# Patient Record
Sex: MALE | Race: WHITE | Employment: FULL TIME | URBAN - METROPOLITAN AREA
[De-identification: names, ages, dates, MRNs, and addresses within clinical notes are randomized per-mention and may not be internally consistent; named-entity substitution may affect disease eponyms.]

---

## 2017-03-03 ENCOUNTER — OFFICE VISIT (OUTPATIENT)
Dept: FAMILY MEDICINE CLINIC | Facility: CLINIC | Age: 52
End: 2017-03-03

## 2017-03-03 VITALS
BODY MASS INDEX: 23.49 KG/M2 | WEIGHT: 167.81 LBS | HEART RATE: 80 BPM | TEMPERATURE: 98 F | HEIGHT: 71 IN | DIASTOLIC BLOOD PRESSURE: 70 MMHG | SYSTOLIC BLOOD PRESSURE: 126 MMHG | RESPIRATION RATE: 16 BRPM

## 2017-03-03 DIAGNOSIS — M62.830 MUSCLE SPASM OF BACK: ICD-10-CM

## 2017-03-03 DIAGNOSIS — G89.29 CHRONIC RIGHT-SIDED LOW BACK PAIN WITHOUT SCIATICA: Primary | ICD-10-CM

## 2017-03-03 DIAGNOSIS — M54.50 CHRONIC RIGHT-SIDED LOW BACK PAIN WITHOUT SCIATICA: Primary | ICD-10-CM

## 2017-03-03 PROCEDURE — 99214 OFFICE O/P EST MOD 30 MIN: CPT | Performed by: FAMILY MEDICINE

## 2017-03-03 RX ORDER — CYCLOBENZAPRINE HCL 10 MG
10 TABLET ORAL NIGHTLY PRN
Qty: 30 TABLET | Refills: 0 | Status: SHIPPED | OUTPATIENT
Start: 2017-03-03 | End: 2017-04-02

## 2017-03-03 NOTE — PATIENT INSTRUCTIONS
Advice rest, heating pad, aleve or ibuprofen as needed. Take cyclobenzaprine as needed. Medication will make you drowsy, so no driving after taking medication. Relieving Back Pain  Back pain is a common problem.  You can strain back muscles by li

## 2017-03-04 NOTE — PROGRESS NOTES
[facility] [place]  PROGRESS NOTE  Chief Complaint:   Patient presents with:  Back Pain: Back pain, has 2 metal rods in back picked something up at work and the next day was really sore      HPI:   This is a 49year old male presents complaining INTEGUMENTARY:  Denies rashes, itching, skin lesion, or excessive skin dryness.   CARDIOVASCULAR:  Denies chest pain, chest pressure, chest discomfort, palpitations, edema, dyspnea on exertion or at rest.  RESPIRATORY:  Denies shortness of breath, wheezing, PSYCHIATRIC: alert and oriented x 3; affect appropriate        ASSESSMENT AND PLAN:   Antoni Steiner was seen today for back pain.     Diagnoses and all orders for this visit:    Chronic right-sided low back pain without sciatica    Muscle spasm of back    Other o · Don’t use a heating pad for more than 15 minutes at a time. Never sleep on a heating pad. Date Last Reviewed: 9/1/2015  © 2733-4852 [address], [address] [place] [address]. All rights reserved.  This information is not intend

## 2017-03-17 ENCOUNTER — TELEPHONE (OUTPATIENT)
Dept: FAMILY MEDICINE CLINIC | Facility: CLINIC | Age: 52
End: 2017-03-17

## 2017-03-17 NOTE — TELEPHONE ENCOUNTER
Future Appointments  Date Time Provider Amadeo Hanna   3/17/2017 3:30 PM Uzma Roca MD [facility] [place]     Set up appt due to patient's back not feeling any better.

## 2017-03-30 RX ORDER — CYCLOBENZAPRINE HCL 10 MG
TABLET ORAL
Qty: 30 TABLET | Refills: 0 | OUTPATIENT
Start: 2017-03-30

## 2017-05-11 ENCOUNTER — OFFICE VISIT (OUTPATIENT)
Dept: FAMILY MEDICINE CLINIC | Facility: CLINIC | Age: 52
End: 2017-05-11

## 2017-05-11 ENCOUNTER — TELEPHONE (OUTPATIENT)
Dept: FAMILY MEDICINE CLINIC | Facility: CLINIC | Age: 52
End: 2017-05-11

## 2017-05-11 VITALS
TEMPERATURE: 98 F | DIASTOLIC BLOOD PRESSURE: 90 MMHG | SYSTOLIC BLOOD PRESSURE: 114 MMHG | HEART RATE: 96 BPM | BODY MASS INDEX: 23 KG/M2 | WEIGHT: 162.81 LBS | RESPIRATION RATE: 20 BRPM

## 2017-05-11 DIAGNOSIS — K21.9 GASTROESOPHAGEAL REFLUX DISEASE, ESOPHAGITIS PRESENCE NOT SPECIFIED: ICD-10-CM

## 2017-05-11 DIAGNOSIS — G89.29 CHRONIC RIGHT-SIDED LOW BACK PAIN WITHOUT SCIATICA: ICD-10-CM

## 2017-05-11 DIAGNOSIS — R10.13 EPIGASTRIC PAIN: Primary | ICD-10-CM

## 2017-05-11 DIAGNOSIS — M54.50 CHRONIC RIGHT-SIDED LOW BACK PAIN WITHOUT SCIATICA: ICD-10-CM

## 2017-05-11 PROCEDURE — 83690 ASSAY OF LIPASE: CPT | Performed by: FAMILY MEDICINE

## 2017-05-11 PROCEDURE — 82150 ASSAY OF AMYLASE: CPT | Performed by: FAMILY MEDICINE

## 2017-05-11 PROCEDURE — 80053 COMPREHEN METABOLIC PANEL: CPT | Performed by: FAMILY MEDICINE

## 2017-05-11 PROCEDURE — 85025 COMPLETE CBC W/AUTO DIFF WBC: CPT | Performed by: FAMILY MEDICINE

## 2017-05-11 PROCEDURE — 36415 COLL VENOUS BLD VENIPUNCTURE: CPT | Performed by: FAMILY MEDICINE

## 2017-05-11 PROCEDURE — 99214 OFFICE O/P EST MOD 30 MIN: CPT | Performed by: FAMILY MEDICINE

## 2017-05-11 RX ORDER — TRAMADOL HYDROCHLORIDE 50 MG/1
50 TABLET ORAL EVERY 4 HOURS PRN
Qty: 30 TABLET | Refills: 0 | Status: SHIPPED | OUTPATIENT
Start: 2017-05-11 | End: 2017-06-21

## 2017-05-11 RX ORDER — OMEPRAZOLE 20 MG/1
20 CAPSULE, DELAYED RELEASE ORAL
Qty: 30 CAPSULE | Refills: 0 | Status: SHIPPED | OUTPATIENT
Start: 2017-05-11 | End: 2017-06-21

## 2017-05-11 NOTE — PROGRESS NOTES
[address]  PROGRESS NOTE  Chief Complaint:   Patient presents with:  Abdominal Pain: for about 2 weeks. Usually after he eats       HPI:   This is a 49year old male presents complaining of epigastric pain for past 2 weeks.   Patient's p TraMADol HCl (ULTRAM) 50 MG Oral Tab Take 1 tablet (50 mg total) by mouth every 4 (four) hours as needed. Disp: 30 tablet Rfl: 0   gabapentin 300 MG Oral Cap Take 300 mg by mouth. 2 tablets by mouth three times per day.  Disp:  Rfl:    [DISCONTINUED] TraMAD HEART:  Regular rate and rhythm, S1 and S2 are normal, no murmurs, rubs or gallops. EXTREMITIES: No edema, no cyanosis, no clubbing, FROM, 2+ dorsalis pedis pulses bilaterally.   ABDOMEN: Soft, nondistended, slight tenderness palpation over epigastric shayan · Eat fewer acidic foods, such as citrus and tomato-based foods. These can increase symptoms. · Limit drinking alcohol, caffeine, and fizzy beverages. All increase acid reflux.   · Try limiting chocolate, peppermint, and spearmint. These can worsen acid re Generalized hyperhidrosis     Chronic pain     Routine general medical examination at a health care facility     Pre-operative examination     Other psoriasis     Sprain of costal cartilage     Injury of shoulder, left     Acute reaction to stress     G

## 2017-05-11 NOTE — PATIENT INSTRUCTIONS
Start omeprazole. Avoid food that causes acid reflux. Avoid large meals, don't eat close to bedtime. Check labs today. Recheck in 2 weeks if no improvement.          Tips to Control Acid Reflux    To control acid reflux, you’ll need to make some basic

## 2017-05-12 ENCOUNTER — TELEPHONE (OUTPATIENT)
Dept: FAMILY MEDICINE CLINIC | Facility: CLINIC | Age: 52
End: 2017-05-12

## 2017-05-12 NOTE — TELEPHONE ENCOUNTER
----- Message from Salvador Osorio MD sent at 5/11/2017 10:48 PM CDT -----  Please inform patient that CBC, CMP, amylase and lipase are all within normal limits. Advised to return to clinic if his abdominal pain does not improve.

## 2017-06-21 ENCOUNTER — HOSPITAL ENCOUNTER (OUTPATIENT)
Dept: GENERAL RADIOLOGY | Age: 52
Discharge: HOME | End: 2017-06-21
Attending: FAMILY MEDICINE
Payer: COMMERCIAL

## 2017-06-21 ENCOUNTER — TELEPHONE (OUTPATIENT)
Dept: FAMILY MEDICINE CLINIC | Facility: CLINIC | Age: 52
End: 2017-06-21

## 2017-06-21 ENCOUNTER — OFFICE VISIT (OUTPATIENT)
Dept: FAMILY MEDICINE CLINIC | Facility: CLINIC | Age: 52
End: 2017-06-21

## 2017-06-21 VITALS
DIASTOLIC BLOOD PRESSURE: 64 MMHG | HEIGHT: 71.5 IN | SYSTOLIC BLOOD PRESSURE: 104 MMHG | RESPIRATION RATE: 20 BRPM | WEIGHT: 164.5 LBS | HEART RATE: 88 BPM | TEMPERATURE: 98 F | BODY MASS INDEX: 22.53 KG/M2

## 2017-06-21 DIAGNOSIS — G89.29 CHRONIC RIGHT-SIDED LOW BACK PAIN WITHOUT SCIATICA: ICD-10-CM

## 2017-06-21 DIAGNOSIS — Z12.11 COLON CANCER SCREENING: ICD-10-CM

## 2017-06-21 DIAGNOSIS — M25.561 ACUTE PAIN OF RIGHT KNEE: ICD-10-CM

## 2017-06-21 DIAGNOSIS — K64.9 HEMORRHOIDS, UNSPECIFIED HEMORRHOID TYPE: ICD-10-CM

## 2017-06-21 DIAGNOSIS — M54.50 CHRONIC RIGHT-SIDED LOW BACK PAIN WITHOUT SCIATICA: ICD-10-CM

## 2017-06-21 DIAGNOSIS — Z00.00 PHYSICAL EXAM: Primary | ICD-10-CM

## 2017-06-21 DIAGNOSIS — K92.1 BLOOD IN STOOL: ICD-10-CM

## 2017-06-21 PROCEDURE — 73560 X-RAY EXAM OF KNEE 1 OR 2: CPT | Performed by: FAMILY MEDICINE

## 2017-06-21 PROCEDURE — 99213 OFFICE O/P EST LOW 20 MIN: CPT | Performed by: FAMILY MEDICINE

## 2017-06-21 PROCEDURE — 82272 OCCULT BLD FECES 1-3 TESTS: CPT | Performed by: FAMILY MEDICINE

## 2017-06-21 PROCEDURE — 99396 PREV VISIT EST AGE 40-64: CPT | Performed by: FAMILY MEDICINE

## 2017-06-21 RX ORDER — TRAMADOL HYDROCHLORIDE 50 MG/1
50 TABLET ORAL EVERY 8 HOURS PRN
Qty: 60 TABLET | Refills: 0 | Status: SHIPPED | OUTPATIENT
Start: 2017-06-21 | End: 2017-08-15

## 2017-06-21 RX ORDER — CEPHALEXIN 500 MG/1
500 CAPSULE ORAL 3 TIMES DAILY
Qty: 30 CAPSULE | Refills: 0 | Status: SHIPPED | OUTPATIENT
Start: 2017-06-21 | End: 2017-09-28

## 2017-06-21 RX ORDER — MELOXICAM 7.5 MG/1
7.5 TABLET ORAL 2 TIMES DAILY PRN
Qty: 30 TABLET | Refills: 0 | Status: SHIPPED | OUTPATIENT
Start: 2017-06-21 | End: 2017-09-28

## 2017-06-21 RX ORDER — GABAPENTIN 300 MG/1
CAPSULE ORAL
Qty: 120 CAPSULE | Refills: 2 | Status: SHIPPED | OUTPATIENT
Start: 2017-06-21 | End: 2017-08-15

## 2017-06-21 NOTE — PATIENT INSTRUCTIONS
Recommend rest, ice and meloxicam as needed for pain. May also use knee brace as needed   Start oral antibiotics for stye on R eye. Daily warm compress. See ophthalmologist at [address] if no improvement.    Schedule appointment with Dr Jeevan Ballard for colon

## 2017-06-21 NOTE — PROGRESS NOTES
HPI:   Helen Echavarria is a 49year old male who presents for an Annual Health Visit. Patient also complaining of stye or right upper eyelid that has been present for past 1 week. He denies any trouble with vision.   Also complaining of right knee p INGUINAL HERNIA REPAIR Left     TONSILLECTOMY      OTHER      Comment L shoulder surgery, L hand tendon repair      Family History   Problem Relation Age of Onset   • leukemia [Other] [OTHER] Mother       SOCIAL HISTORY     Smoking Status: Current Every Da present, tender with palpation, no warmth noted. Normal visual acuity  HEENT: normocephalic; normal nose, pharynx. TM's clear, no bulging, no retraction, no fluid, landmarks normal.  NECK: supple;  Full ROM; no JVD, no TMG, no carotid bruits  RESPIRATORY: unspecified hemorrhoid type  Blood in stool  Colon cancer screening    PLAN:       Signed Prescriptions Disp Refills    gabapentin 300 MG Oral Cap 120 capsule 2      Si tablets by mouth two times a day.       TraMADol HCl (ULTRAM) 50 MG Oral Tab 60 tabl

## 2017-06-21 NOTE — TELEPHONE ENCOUNTER
----- Message from Nadja Max MD sent at 6/21/2017  4:45 PM CDT -----  Please inform patient that radiology report shows mild inside of the joint osteoarthritis, there is also some fluid present in his joint.   Recommend to return to clinic if his pain d

## 2017-08-15 NOTE — TELEPHONE ENCOUNTER
Is needing refill on Tramadol & gabapentin. Will be going out of town in a couple of days and will need before. Please call when ready to .

## 2017-08-16 RX ORDER — GABAPENTIN 300 MG/1
CAPSULE ORAL
Qty: 120 CAPSULE | Refills: 1 | Status: SHIPPED | OUTPATIENT
Start: 2017-08-16 | End: 2017-09-28

## 2017-08-16 RX ORDER — TRAMADOL HYDROCHLORIDE 50 MG/1
50 TABLET ORAL EVERY 8 HOURS PRN
Qty: 60 TABLET | Refills: 0 | Status: SHIPPED | OUTPATIENT
Start: 2017-08-16 | End: 2017-09-28

## 2017-09-28 ENCOUNTER — OFFICE VISIT (OUTPATIENT)
Dept: FAMILY MEDICINE CLINIC | Facility: CLINIC | Age: 52
End: 2017-09-28

## 2017-09-28 VITALS
SYSTOLIC BLOOD PRESSURE: 100 MMHG | WEIGHT: 166.81 LBS | DIASTOLIC BLOOD PRESSURE: 68 MMHG | HEART RATE: 96 BPM | BODY MASS INDEX: 23.35 KG/M2 | HEIGHT: 71 IN | TEMPERATURE: 99 F | RESPIRATION RATE: 16 BRPM

## 2017-09-28 DIAGNOSIS — G89.29 CHRONIC BILATERAL LOW BACK PAIN WITHOUT SCIATICA: Primary | ICD-10-CM

## 2017-09-28 DIAGNOSIS — M54.50 CHRONIC BILATERAL LOW BACK PAIN WITHOUT SCIATICA: Primary | ICD-10-CM

## 2017-09-28 DIAGNOSIS — M62.830 MUSCLE SPASM OF BACK: ICD-10-CM

## 2017-09-28 PROCEDURE — 99214 OFFICE O/P EST MOD 30 MIN: CPT | Performed by: FAMILY MEDICINE

## 2017-09-28 RX ORDER — TRAMADOL HYDROCHLORIDE 50 MG/1
50 TABLET ORAL EVERY 8 HOURS PRN
Qty: 60 TABLET | Refills: 0 | Status: SHIPPED | OUTPATIENT
Start: 2017-09-28 | End: 2017-11-17

## 2017-09-28 RX ORDER — GABAPENTIN 300 MG/1
CAPSULE ORAL
Qty: 120 CAPSULE | Refills: 1 | Status: SHIPPED | OUTPATIENT
Start: 2017-09-28 | End: 2018-02-27

## 2017-09-28 NOTE — PROGRESS NOTES
[facility] [place]  PROGRESS NOTE  Chief Complaint:   Patient presents with:  Medication Request: medical marijuana       HPI:   This is a 47year old male coming in for refill of his medications for his chronic low back pain.   Patient has been laminectomy 25                yrs ago  No date: INGUINAL HERNIA REPAIR Left  No date: OTHER      Comment: L shoulder surgery, L hand tendon repair  No date: TONSILLECTOMY  Social History:  Smoking status: Current Every Day Smoker intolerance, polyuria or polydipsia. ALLERGIES:  Denies allergic response, history of asthma, sneezing, hives, eczema or rhinitis.      EXAM:   /68 (BP Location: Left arm, Patient Position: Sitting, Cuff Size: large)   Pulse 96   Temp 98.5 °F (36.9 ° tablets by mouth two times a day. -     TraMADol HCl (ULTRAM) 50 MG Oral Tab; Take 1 tablet (50 mg total) by mouth every 8 (eight) hours as needed.     A/P: Patient here for follow-up for chronic low back pain that has been quite disabling for this gentlem reflux disease     Acute pain of right knee     Chronic right-sided low back pain without sciatica     Hemorrhoids      Myesha Adler MD  9/28/2017  2:58 PM

## 2017-10-09 ENCOUNTER — TELEPHONE (OUTPATIENT)
Dept: FAMILY MEDICINE CLINIC | Facility: CLINIC | Age: 52
End: 2017-10-09

## 2017-10-09 NOTE — TELEPHONE ENCOUNTER
MD Yumiko Farrell, [address]             Please remove labs for all patient.     Previous Messages      ----- Message -----   From: Yumiko Tang CMA   Sent: 10/9/2017  10:35 AM   To: Fiorella Adkins MD     A letter has been mailed to patient

## 2017-11-17 RX ORDER — TRAMADOL HYDROCHLORIDE 50 MG/1
50 TABLET ORAL EVERY 8 HOURS PRN
Qty: 60 TABLET | Refills: 0 | Status: SHIPPED | OUTPATIENT
Start: 2017-11-17 | End: 2018-01-15

## 2017-11-17 NOTE — TELEPHONE ENCOUNTER
Tramadol 50 mg 1 tab every 8 hours as needed.    Last RF 9/28/17 60 tab, 0 refills    Future appt:  n/a  Last Appointment:  9/28/2017  Last labs CMP, CBC, Lipase, Amylase 5/11/17

## 2018-01-15 RX ORDER — TRAMADOL HYDROCHLORIDE 50 MG/1
TABLET ORAL
Qty: 60 TABLET | Refills: 0 | Status: SHIPPED | OUTPATIENT
Start: 2018-01-15 | End: 2018-02-26

## 2018-01-15 NOTE — TELEPHONE ENCOUNTER
Future appt:    Last Appointment:  9/28/2017  Tramadol last refilled:  11/17/17 - #60 was given at that time    No results found for: CHOLEST, HDL, LDL, TRIGLY, TRIG  No results found for: EAG, A1C  No results found for: T4F, TSH, TSHT4    No Follow-up on

## 2018-02-26 RX ORDER — GABAPENTIN 300 MG/1
CAPSULE ORAL
Qty: 120 CAPSULE | Refills: 0 | Status: CANCELLED | OUTPATIENT
Start: 2018-02-26

## 2018-02-26 RX ORDER — TRAMADOL HYDROCHLORIDE 50 MG/1
TABLET ORAL
Qty: 60 TABLET | Refills: 0 | Status: SHIPPED | OUTPATIENT
Start: 2018-02-26 | End: 2018-02-28

## 2018-02-26 NOTE — TELEPHONE ENCOUNTER
Tramadol 50 mg tab every 8 hours as needed  Last RF 1/15/18    Future appt:    Last Appointment:  9/28/2017 (insructions: return if sxs worsen/fail to improve)    No results found for: CHOLEST, HDL, LDL, TRIGLY, TRIG  No results found for: EAG, A1C  No res

## 2018-02-26 NOTE — TELEPHONE ENCOUNTER
No future appointments. Future appt:    Last Appointment:  Visit date not found  No results found for: CHOLEST, HDL, LDL, TRIGLY, TRIG  No results found for: EAG, A1C  No results found for: T4F, TSH, TSHT4    No Follow-up on file.

## 2018-02-27 RX ORDER — GABAPENTIN 300 MG/1
CAPSULE ORAL
Qty: 120 CAPSULE | Refills: 3 | Status: SHIPPED | OUTPATIENT
Start: 2018-02-27 | End: 2018-04-17

## 2018-02-27 NOTE — TELEPHONE ENCOUNTER
Med refilled  Can be picked up or faxed  Due for appt next month   Reviewed [facility] prescription monitoring program.

## 2018-02-27 NOTE — TELEPHONE ENCOUNTER
Pt notified and verbalized understanding. Pt states that he also requested a refill on gabapentin. Last Rx 9/28/17.     Future appt:  None  Last Appointment:  9/28/2017  No results found for: CHOLEST, HDL, LDL, TRIGLY, TRIG  No results found for: EAG, A1

## 2018-02-28 NOTE — TELEPHONE ENCOUNTER
Future appt:    Last Appointment:  9/28/2017  Med already refilled on 2/26/18-  Request too soon    No results found for: CHOLEST, HDL, LDL, TRIGLY, TRIG  No results found for: EAG, A1C  No results found for: T4F, TSH, TSHT4    No Follow-up on file.

## 2018-03-01 RX ORDER — TRAMADOL HYDROCHLORIDE 50 MG/1
TABLET ORAL
Qty: 60 TABLET | Refills: 0 | Status: SHIPPED | OUTPATIENT
Start: 2018-03-01 | End: 2018-04-17

## 2018-03-01 NOTE — TELEPHONE ENCOUNTER
Script signed  May be picked up   [facility] prescription monitoring program.

## 2018-04-17 ENCOUNTER — TELEPHONE (OUTPATIENT)
Dept: FAMILY MEDICINE CLINIC | Facility: CLINIC | Age: 53
End: 2018-04-17

## 2018-04-17 RX ORDER — TRAMADOL HYDROCHLORIDE 50 MG/1
TABLET ORAL
Qty: 60 TABLET | Refills: 0 | Status: SHIPPED | OUTPATIENT
Start: 2018-04-17 | End: 2018-04-18

## 2018-04-17 RX ORDER — GABAPENTIN 300 MG/1
CAPSULE ORAL
Qty: 120 CAPSULE | Refills: 0 | Status: SHIPPED | OUTPATIENT
Start: 2018-04-17 | End: 2018-05-25

## 2018-04-17 NOTE — TELEPHONE ENCOUNTER
meds refilled  Due for appt prior to further refills   Reviewed [facility] prescription monitoring program.

## 2018-04-18 RX ORDER — TRAMADOL HYDROCHLORIDE 50 MG/1
TABLET ORAL
Qty: 60 TABLET | Refills: 0 | Status: SHIPPED | OUTPATIENT
Start: 2018-04-18 | End: 2018-05-25

## 2018-04-19 ENCOUNTER — TELEPHONE (OUTPATIENT)
Dept: FAMILY MEDICINE CLINIC | Facility: CLINIC | Age: 53
End: 2018-04-19

## 2018-04-19 NOTE — TELEPHONE ENCOUNTER
Spoke with Guerda Mustafa at [facility] received to scripts notified only one was to be filled for Tramadol expressed understanding and thanks.

## 2018-05-25 RX ORDER — TRAMADOL HYDROCHLORIDE 50 MG/1
TABLET ORAL
Qty: 60 TABLET | Refills: 0 | OUTPATIENT
Start: 2018-05-25

## 2018-05-25 RX ORDER — TRAMADOL HYDROCHLORIDE 50 MG/1
TABLET ORAL
Qty: 30 TABLET | Refills: 0 | Status: SHIPPED | OUTPATIENT
Start: 2018-05-25 | End: 2018-06-15

## 2018-05-25 RX ORDER — GABAPENTIN 300 MG/1
CAPSULE ORAL
Qty: 60 CAPSULE | Refills: 0 | Status: SHIPPED | OUTPATIENT
Start: 2018-05-25 | End: 2018-06-15

## 2018-05-25 NOTE — TELEPHONE ENCOUNTER
Please advise refill of tramadol and gabapentin.   Last Rx: 4/18/18      Future appt:  None  Last Appointment:  9/28/18  No results found for: CHOLEST, HDL, LDL, TRIGLY, TRIG  No results found for: EAG, A1C  No results found for: T4F, TSH, TSHT4    No Follo

## 2018-05-25 NOTE — TELEPHONE ENCOUNTER
Pt notified and declined to make an appt at this time. He stated that he would call back to schedule an appt. Script faxed to pharmacy.

## 2018-05-25 NOTE — TELEPHONE ENCOUNTER
Reviewed [facility] prescription monitoring program.  Overdue for appt  Only 2 week supply  Must be seen, no further refills

## 2018-06-15 ENCOUNTER — TELEPHONE (OUTPATIENT)
Dept: FAMILY MEDICINE CLINIC | Facility: CLINIC | Age: 53
End: 2018-06-15

## 2018-06-15 RX ORDER — GABAPENTIN 300 MG/1
CAPSULE ORAL
Qty: 60 CAPSULE | Refills: 0 | Status: SHIPPED | OUTPATIENT
Start: 2018-06-15 | End: 2018-07-11

## 2018-06-15 RX ORDER — TRAMADOL HYDROCHLORIDE 50 MG/1
TABLET ORAL
Qty: 30 TABLET | Refills: 0 | Status: SHIPPED | OUTPATIENT
Start: 2018-06-15 | End: 2018-07-11

## 2018-06-15 NOTE — TELEPHONE ENCOUNTER
has appt 6/28 but will be out of meds by then- is requesting a 2 week supply of tramadol and gabapentin [facility] to [facility]

## 2018-06-15 NOTE — TELEPHONE ENCOUNTER
Please advise refill of gabapentin and tramadol to get him to appointment. Future appt:     Your appointments     Date & Time Appointment Department [facility])    Jun 28, 2018  3:30 PM CDT Physical - Established Patient with Julieta Snyder MD [facility]

## 2018-07-11 ENCOUNTER — TELEPHONE (OUTPATIENT)
Dept: FAMILY MEDICINE CLINIC | Facility: CLINIC | Age: 53
End: 2018-07-11

## 2018-07-11 RX ORDER — GABAPENTIN 300 MG/1
CAPSULE ORAL
Qty: 60 CAPSULE | Refills: 0 | Status: SHIPPED | OUTPATIENT
Start: 2018-07-11 | End: 2018-07-16

## 2018-07-11 RX ORDER — TRAMADOL HYDROCHLORIDE 50 MG/1
TABLET ORAL
Qty: 30 TABLET | Refills: 0 | Status: SHIPPED | OUTPATIENT
Start: 2018-07-11 | End: 2018-07-16

## 2018-07-11 NOTE — TELEPHONE ENCOUNTER
Future Appointments  Date Time Provider Amadeo Hanna   7/16/2018 6:00 PM Danna Garcia MD [facility]     Dr Jovani Osborn can you please advise.  Thank you

## 2018-07-16 ENCOUNTER — OFFICE VISIT (OUTPATIENT)
Dept: FAMILY MEDICINE CLINIC | Facility: CLINIC | Age: 53
End: 2018-07-16

## 2018-07-16 VITALS
HEART RATE: 62 BPM | TEMPERATURE: 98 F | SYSTOLIC BLOOD PRESSURE: 124 MMHG | RESPIRATION RATE: 20 BRPM | OXYGEN SATURATION: 100 % | DIASTOLIC BLOOD PRESSURE: 76 MMHG | WEIGHT: 178 LBS | BODY MASS INDEX: 24.92 KG/M2 | HEIGHT: 71 IN

## 2018-07-16 DIAGNOSIS — G89.29 CHRONIC BILATERAL LOW BACK PAIN WITHOUT SCIATICA: Primary | ICD-10-CM

## 2018-07-16 DIAGNOSIS — M62.830 MUSCLE SPASM OF BACK: ICD-10-CM

## 2018-07-16 DIAGNOSIS — M54.50 CHRONIC BILATERAL LOW BACK PAIN WITHOUT SCIATICA: Primary | ICD-10-CM

## 2018-07-16 PROCEDURE — 99213 OFFICE O/P EST LOW 20 MIN: CPT | Performed by: FAMILY MEDICINE

## 2018-07-16 RX ORDER — GABAPENTIN 300 MG/1
CAPSULE ORAL
Qty: 180 CAPSULE | Refills: 0 | Status: SHIPPED | OUTPATIENT
Start: 2018-07-16 | End: 2018-11-21

## 2018-07-16 RX ORDER — TRAMADOL HYDROCHLORIDE 50 MG/1
TABLET ORAL
Qty: 60 TABLET | Refills: 1 | Status: SHIPPED | OUTPATIENT
Start: 2018-07-16 | End: 2018-10-27

## 2018-07-16 NOTE — PATIENT INSTRUCTIONS
Advice rest, heating pad, aleve or ibuprofen as needed. Take tramadol and gabapentin as needed . Medication will make you drowsy, so no driving after taking medication. Consider physical therapy if no improvement. Also consider MRI if pain worse.

## 2018-07-16 NOTE — PROGRESS NOTES
[facility] [place]  PROGRESS NOTE  Chief Complaint:   Patient presents with:  Medication Follow-Up      HPI:   This is a 47year old male with history of chronic back pain and back surgery resents for follow-up and medication refill.   Patient ha tablet Rfl: 1      Ready to quit: Not Answered  Counseling given: Not Answered         REVIEW OF SYSTEMS:   CONSTITUTIONAL:  Denies unusual weight gain/loss, fever, chills, or fatigue.    INTEGUMENTARY:  Denies rashes, itching, skin lesion, or excessive ski MOUTH EVERY EIGHT HOURS AS NEEDED      Patient Instructions   Advice rest, heating pad, aleve or ibuprofen as needed. Take tramadol and gabapentin as needed . Medication will make you drowsy, so no driving after taking medication.     Consider physical th

## 2018-09-22 RX ORDER — TRAMADOL HYDROCHLORIDE 50 MG/1
TABLET ORAL
Qty: 60 TABLET | Refills: 1 | Status: CANCELLED | OUTPATIENT
Start: 2018-09-22

## 2018-09-22 NOTE — TELEPHONE ENCOUNTER
Pt had one more refill. Pt will have the refill from [facility] transferred to an [facility]. Pt expressed understanding and thanks.

## 2018-09-22 NOTE — TELEPHONE ENCOUNTER
Patient needs refill for his tramadol medication to [facility] pharmacy in [address] [place] [address]. Needs today.

## 2018-09-22 NOTE — TELEPHONE ENCOUNTER
Future appt:    Last Appointment:  Visit date not found  No results found for: CHOLEST, HDL, LDL, TRIGLY, TRIG  No results found for: EAG, A1C  No results found for: T4F, TSH, TSHT4    No Follow-up on file.     Pt saw Dr. Meghan Villegas on 7/16/18 for lower back pa

## 2018-10-27 ENCOUNTER — TELEPHONE (OUTPATIENT)
Dept: FAMILY MEDICINE CLINIC | Facility: CLINIC | Age: 53
End: 2018-10-27

## 2018-10-27 RX ORDER — TRAMADOL HYDROCHLORIDE 50 MG/1
TABLET ORAL
Qty: 30 TABLET | Refills: 0 | OUTPATIENT
Start: 2018-10-27 | End: 2018-11-16

## 2018-10-27 RX ORDER — TRAMADOL HYDROCHLORIDE 50 MG/1
TABLET ORAL
Qty: 30 TABLET | Refills: 0 | Status: SHIPPED | OUTPATIENT
Start: 2018-10-27 | End: 2018-10-27

## 2018-10-27 NOTE — TELEPHONE ENCOUNTER
Called by [facility] with questions about the prescription called in earlier. Pt contacted. Rx was supposed to go to [facility]. Rx at [facility] cancelled and new Rx sent to [facility].

## 2018-10-27 NOTE — TELEPHONE ENCOUNTER
Dr. Makayla Potts saw patient last and refilled this medication.      Future appt:    Last Appointment:  7/16/2018    No results found for: CHOLEST, HDL, LDL, TRIGLY, TRIG  No results found for: EAG, A1C  No results found for: T4F, TSH, TSHT4    Last RF:  7/16/201

## 2018-10-27 NOTE — TELEPHONE ENCOUNTER
ROYA Resendiz    to     [facility] in [place] on [address]       - needs today if Possible     call back please

## 2018-11-16 ENCOUNTER — TELEPHONE (OUTPATIENT)
Dept: FAMILY MEDICINE CLINIC | Facility: CLINIC | Age: 53
End: 2018-11-16

## 2018-11-16 RX ORDER — TRAMADOL HYDROCHLORIDE 50 MG/1
TABLET ORAL
Qty: 10 TABLET | Refills: 0 | Status: SHIPPED | OUTPATIENT
Start: 2018-11-16 | End: 2018-11-21

## 2018-11-16 NOTE — TELEPHONE ENCOUNTER
Future appt:    Last Appointment:  Visit date not found  No results found for: CHOLEST, HDL, LDL, TRIGLY, TRIG  No results found for: EAG, A1C  No results found for: T4F, TSH, TSHT4    No Follow-up on file. No future appointments.     Last office visit w

## 2018-11-19 ENCOUNTER — TELEPHONE (OUTPATIENT)
Dept: FAMILY MEDICINE CLINIC | Facility: CLINIC | Age: 53
End: 2018-11-19

## 2018-11-19 RX ORDER — TRAMADOL HYDROCHLORIDE 50 MG/1
TABLET ORAL
Qty: 10 TABLET | Refills: 0 | OUTPATIENT
Start: 2018-11-19

## 2018-11-19 NOTE — TELEPHONE ENCOUNTER
Future Appointments   Date Time Provider Amadeo Cyndi   11/21/2018  4:40 PM Sonam Dixon MD [facility] [place]        Return in about 3 months (around 10/16/2018).

## 2018-11-21 ENCOUNTER — OFFICE VISIT (OUTPATIENT)
Dept: FAMILY MEDICINE CLINIC | Facility: CLINIC | Age: 53
End: 2018-11-21

## 2018-11-21 VITALS
HEIGHT: 71 IN | TEMPERATURE: 99 F | HEART RATE: 72 BPM | WEIGHT: 171.38 LBS | RESPIRATION RATE: 18 BRPM | SYSTOLIC BLOOD PRESSURE: 138 MMHG | DIASTOLIC BLOOD PRESSURE: 80 MMHG | BODY MASS INDEX: 23.99 KG/M2

## 2018-11-21 DIAGNOSIS — G89.29 CHRONIC BILATERAL LOW BACK PAIN WITHOUT SCIATICA: Primary | ICD-10-CM

## 2018-11-21 DIAGNOSIS — M62.838 NECK MUSCLE SPASM: ICD-10-CM

## 2018-11-21 DIAGNOSIS — M54.50 CHRONIC BILATERAL LOW BACK PAIN WITHOUT SCIATICA: Primary | ICD-10-CM

## 2018-11-21 PROCEDURE — 99213 OFFICE O/P EST LOW 20 MIN: CPT | Performed by: FAMILY MEDICINE

## 2018-11-21 RX ORDER — GABAPENTIN 300 MG/1
CAPSULE ORAL
Qty: 180 CAPSULE | Refills: 0 | Status: SHIPPED | OUTPATIENT
Start: 2018-11-21 | End: 2020-01-07 | Stop reason: ALTCHOICE

## 2018-11-21 RX ORDER — TRAMADOL HYDROCHLORIDE 50 MG/1
TABLET ORAL
Qty: 30 TABLET | Refills: 2 | Status: SHIPPED | OUTPATIENT
Start: 2018-11-21 | End: 2020-01-07 | Stop reason: ALTCHOICE

## 2018-11-21 NOTE — PROGRESS NOTES
[address]  PROGRESS NOTE  Chief Complaint:   Patient presents with:   Follow - Up      HPI:   This is a 47year old male with history of a chronic low back pain and recently started having neck muscle spasm presents for medication refill exertion or at rest.  RESPIRATORY:  Denies shortness of breath, wheezing, cough or sputum. GASTROINTESTINAL:  Denies abdominal pain, nausea, vomiting, constipation, diarrhea, or blood in stool.   MUSCULOSKELETAL: See HPI  NEUROLOGICAL:  Denies headache, di depressed mood or anxiety      ASSESSMENT AND PLAN:   Leobardo Preciado was seen today for follow - up.     Diagnoses and all orders for this visit:    Chronic bilateral low back pain without sciatica    Neck muscle spasm    Other orders  -     TraMADol HCl 50 MG Ora Gastroesophageal reflux disease     Acute pain of right knee     Chronic right-sided low back pain without sciatica     Hemorrhoids      Savlador Osorio MD

## 2018-11-21 NOTE — PATIENT INSTRUCTIONS
Advice rest, heating pad, pain medication as needed   Medication will make you drowsy, so no driving after taking medication. Recommend physical therapy and xray if pain does not improve. Declined it today.

## 2019-07-04 NOTE — TELEPHONE ENCOUNTER
Med refilled  Will need to be picked up or faxed [Can not Exercise (Disability)] : Exercise: The patient can not exercise due to disability [Diabetic Diet] : diabetic [Poor Adherence] : but does not adhere to the diet [High Caloric Intake] : too high in calories [PPS Score: ____] : Palliative Performance Scale (PPS) Score: [unfilled]

## 2020-01-07 ENCOUNTER — OFFICE VISIT (OUTPATIENT)
Dept: FAMILY MEDICINE CLINIC | Facility: CLINIC | Age: 55
End: 2020-01-07
Payer: COMMERCIAL

## 2020-01-07 VITALS
BODY MASS INDEX: 25.48 KG/M2 | HEIGHT: 71 IN | WEIGHT: 182 LBS | RESPIRATION RATE: 18 BRPM | SYSTOLIC BLOOD PRESSURE: 130 MMHG | DIASTOLIC BLOOD PRESSURE: 82 MMHG | TEMPERATURE: 97 F | HEART RATE: 99 BPM

## 2020-01-07 DIAGNOSIS — M54.9 UPPER BACK PAIN ON RIGHT SIDE: Primary | ICD-10-CM

## 2020-01-07 DIAGNOSIS — N50.9 SCROTAL LESION: ICD-10-CM

## 2020-01-07 DIAGNOSIS — R20.2 TINGLING OF RIGHT UPPER EXTREMITY: ICD-10-CM

## 2020-01-07 PROCEDURE — 99214 OFFICE O/P EST MOD 30 MIN: CPT | Performed by: FAMILY MEDICINE

## 2020-01-07 RX ORDER — CYCLOBENZAPRINE HCL 5 MG
5 TABLET ORAL NIGHTLY PRN
Qty: 30 TABLET | Refills: 0 | Status: SHIPPED | OUTPATIENT
Start: 2020-01-07 | End: 2020-06-26

## 2020-01-07 RX ORDER — MELOXICAM 7.5 MG/1
7.5 TABLET ORAL 2 TIMES DAILY
Qty: 30 TABLET | Refills: 0 | Status: SHIPPED | OUTPATIENT
Start: 2020-01-07 | End: 2020-06-26

## 2020-01-07 NOTE — PROGRESS NOTES
[facility] [place]  PROGRESS NOTE  Chief Complaint:   Patient presents with:  Shoulder Pain: right side pain and numbness for about 3 weeks   Lump: right side testicle       HPI:   This is a 49year old male presents to clinic complaining of katey daily. 30 tablet 0   • cyclobenzaprine 5 MG Oral Tab Take 1 tablet (5 mg total) by mouth nightly as needed for Muscle spasms.  30 tablet 0      Ready to quit: Not Answered  Counseling given: Not Answered         REVIEW OF SYSTEMS:   CONSTITUTIONAL:  Denies lymphadenopathy, no other lymphadenopathy. MUSCULOSKELETAL: Normal ROM, no joint pain, or muscle weakness in all extremity. BACK: Mild tenderness with palpation over right upper paraspinal region, full range of motion, negative SLR test bilaterally.   NE as a result of today.      Problem List:  Patient Active Problem List:     Chronic conjunctivitis     Low back pain     Contusion of scapular region     Superficial injury of cornea     Dermatitis     Disorder of lipid metabolism     Eczema     Closed rib f

## 2020-01-07 NOTE — PATIENT INSTRUCTIONS
Advice rest, heating pad, Meloxicam as needed. Take cyclobenzaprine as needed. Medication will make you drowsy, so no driving after taking medication. Schedule testicular US. Return to clinic if no improvement.

## 2020-01-14 ENCOUNTER — TELEPHONE (OUTPATIENT)
Dept: FAMILY MEDICINE CLINIC | Facility: CLINIC | Age: 55
End: 2020-01-14

## 2020-01-14 ENCOUNTER — HOSPITAL ENCOUNTER (OUTPATIENT)
Dept: ULTRASOUND IMAGING | Age: 55
Discharge: HOME | End: 2020-01-14
Attending: FAMILY MEDICINE
Payer: COMMERCIAL

## 2020-01-14 DIAGNOSIS — N50.9 SCROTAL LESION: Primary | ICD-10-CM

## 2020-01-14 DIAGNOSIS — N50.9 SCROTAL LESION: ICD-10-CM

## 2020-01-14 PROCEDURE — 76870 US EXAM SCROTUM: CPT | Performed by: FAMILY MEDICINE

## 2020-01-14 PROCEDURE — 93975 VASCULAR STUDY: CPT | Performed by: FAMILY MEDICINE

## 2020-01-14 NOTE — TELEPHONE ENCOUNTER
Please inform patient that his ultrasound shows 1.2 x 1.4 x 1.4 cm hypoechoic mass that is just above his right testicle. Radiologist is unsure about its origin. Recommend to see urologist for further evaluation.   Please fax result and referral to Dr. Sd Foley

## 2020-01-15 NOTE — TELEPHONE ENCOUNTER
Patient notified of ultrasound result and Dr. Clarissa Zarco instructions. Referral faxed to Dr. Zora Gerardo. Dr. Rosaline Davis contact information and address given to patient.

## 2020-06-26 ENCOUNTER — OFFICE VISIT (OUTPATIENT)
Dept: FAMILY MEDICINE CLINIC | Facility: CLINIC | Age: 55
End: 2020-06-26
Payer: COMMERCIAL

## 2020-06-26 VITALS
WEIGHT: 168.63 LBS | OXYGEN SATURATION: 96 % | SYSTOLIC BLOOD PRESSURE: 114 MMHG | HEIGHT: 71 IN | HEART RATE: 83 BPM | RESPIRATION RATE: 16 BRPM | DIASTOLIC BLOOD PRESSURE: 80 MMHG | TEMPERATURE: 98 F | BODY MASS INDEX: 23.61 KG/M2

## 2020-06-26 DIAGNOSIS — Z23 NEED FOR TDAP VACCINATION: ICD-10-CM

## 2020-06-26 DIAGNOSIS — Z48.02 VISIT FOR SUTURE REMOVAL: Primary | ICD-10-CM

## 2020-06-26 DIAGNOSIS — S61.419D: ICD-10-CM

## 2020-06-26 PROCEDURE — 90471 IMMUNIZATION ADMIN: CPT | Performed by: FAMILY MEDICINE

## 2020-06-26 PROCEDURE — 90715 TDAP VACCINE 7 YRS/> IM: CPT | Performed by: FAMILY MEDICINE

## 2020-06-26 PROCEDURE — 99213 OFFICE O/P EST LOW 20 MIN: CPT | Performed by: FAMILY MEDICINE
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
## 2020-06-26 NOTE — PATIENT INSTRUCTIONS
5 sutures from L hand removed. 2 fell byitself. No complication. Still healing. Tdap today. Return to clinic if any increase redness, pain, warmth, fever or oozing. Off work for 1 more week to healing.      Recommend to schedule annual exam.

## 2020-06-30 ENCOUNTER — TELEPHONE (OUTPATIENT)
Dept: FAMILY MEDICINE CLINIC | Facility: CLINIC | Age: 55
End: 2020-06-30

## 2020-06-30 NOTE — TELEPHONE ENCOUNTER
Let pt know the following below. Pt verbalized his understanding and had no other questions at this time. Patient will  letter tomorrow morning.

## 2020-06-30 NOTE — TELEPHONE ENCOUNTER
would like a revised return to work note  to resume on 7/6   as he has to carrry heavy ladders and his hand is still hurting        please advise

## 2021-04-06 ENCOUNTER — TELEPHONE (OUTPATIENT)
Dept: FAMILY MEDICINE CLINIC | Facility: CLINIC | Age: 56
End: 2021-04-06

## 2021-04-06 NOTE — TELEPHONE ENCOUNTER
answered YES to 4 COVID travel questions   1) abdominal pain  2) weakness 3) loss of appetite  4) fatigue  ( having GI issues )    tested Negative x 2 to COVID within last 14 days           Future Appointments   Date Time Provider Amadeo Hanna   4/7/2

## 2021-04-07 ENCOUNTER — OFFICE VISIT (OUTPATIENT)
Dept: FAMILY MEDICINE CLINIC | Facility: CLINIC | Age: 56
End: 2021-04-07

## 2021-04-07 VITALS
HEART RATE: 111 BPM | TEMPERATURE: 98 F | RESPIRATION RATE: 16 BRPM | BODY MASS INDEX: 25.6 KG/M2 | WEIGHT: 172.81 LBS | OXYGEN SATURATION: 97 % | SYSTOLIC BLOOD PRESSURE: 124 MMHG | HEIGHT: 69 IN | DIASTOLIC BLOOD PRESSURE: 88 MMHG

## 2021-04-07 DIAGNOSIS — R14.0 ABDOMINAL BLOATING: Primary | ICD-10-CM

## 2021-04-07 DIAGNOSIS — K21.00 GASTROESOPHAGEAL REFLUX DISEASE WITH ESOPHAGITIS WITHOUT HEMORRHAGE: ICD-10-CM

## 2021-04-07 DIAGNOSIS — F17.200 SMOKING: ICD-10-CM

## 2021-04-07 DIAGNOSIS — R53.83 OTHER FATIGUE: ICD-10-CM

## 2021-04-07 PROCEDURE — 3079F DIAST BP 80-89 MM HG: CPT | Performed by: FAMILY MEDICINE

## 2021-04-07 PROCEDURE — 99214 OFFICE O/P EST MOD 30 MIN: CPT | Performed by: FAMILY MEDICINE

## 2021-04-07 PROCEDURE — 3008F BODY MASS INDEX DOCD: CPT | Performed by: FAMILY MEDICINE

## 2021-04-07 PROCEDURE — 3074F SYST BP LT 130 MM HG: CPT | Performed by: FAMILY MEDICINE

## 2021-04-07 RX ORDER — OMEPRAZOLE 40 MG/1
40 CAPSULE, DELAYED RELEASE ORAL DAILY
Qty: 30 CAPSULE | Refills: 0 | Status: SHIPPED | OUTPATIENT
Start: 2021-04-07 | End: 2021-04-13

## 2021-04-07 NOTE — PATIENT INSTRUCTIONS
Symptoms likely due to gerd. Recommend to avoid food that cause acid reflux. Do not eat late at night. Recommend to stop smoking. Drink more water. [facility]   Go to ER if pain worse. Recheck in 1 month. Sooner if symptoms worse.

## 2021-04-07 NOTE — PROGRESS NOTES
[address]  PROGRESS NOTE  Chief Complaint:   Patient presents with:  Stomach Pain: patient is here for stomach isses. patient states that when he eats just a little bit, he feels very bloated.  started about a month ago   Fatigue      HP unusual weight gain/loss, fever, chills, see HPI  EYES:  Denies eye pain, visual loss, blurred vision, double vision or yellow sclerae. INTEGUMENTARY:  Denies rashes, itching, skin lesion, or excessive skin dryness.   CARDIOVASCULAR:  Denies chest pain, c or muscle weakness in all extremity. NEUROLOGICAL:  No deficit, normal gait, strength and tone, sensory intact, normal reflexes.   PSYCHIATRIC: Alert and oriented x 3; affect appropriate, no depressed mood or anxiety      ASSESSMENT AND PLAN:   Ish Xie cornea     Dermatitis     Disorder of lipid metabolism     Eczema     Closed rib fracture     Hand injuries     Inguinal hernia     Spasm of muscle     Muscle wasting     Neuromyositis     Tobacco use disorder     Generalized hyperhidrosis     Chronic pain

## 2021-04-13 ENCOUNTER — TELEPHONE (OUTPATIENT)
Dept: FAMILY MEDICINE CLINIC | Facility: CLINIC | Age: 56
End: 2021-04-13

## 2021-04-13 RX ORDER — PANTOPRAZOLE SODIUM 40 MG/1
40 TABLET, DELAYED RELEASE ORAL
Qty: 30 TABLET | Refills: 0 | Status: SHIPPED | OUTPATIENT
Start: 2021-04-13 | End: 2021-05-07

## 2021-04-13 NOTE — TELEPHONE ENCOUNTER
Recommend to start Protonix twice a day. Also recommend to get labs done which was discussed during office visit. Return to clinic if no improvement. Recommend to go to emergency room if pain gets worse. I have sent prescription to pharmacy.

## 2021-04-13 NOTE — TELEPHONE ENCOUNTER
Patient informed of below. Expressed understanding. He will try to get his labs done at [facility] he is self pay. Patient may end up getting CMP/Amylase/Lipase done 1st.     informed of above/agrees.     Patient needing note to be off w

## 2021-04-13 NOTE — TELEPHONE ENCOUNTER
Pt returned call and states that he has been taking the Omeprazole 40mg daily x 5 days. He states that there has been no improvement. He states that he is able to keep the medication down but he is still having issues with vomiting bile in the morning.

## 2021-04-14 ENCOUNTER — TELEPHONE (OUTPATIENT)
Dept: FAMILY MEDICINE CLINIC | Facility: CLINIC | Age: 56
End: 2021-04-14

## 2021-04-14 NOTE — TELEPHONE ENCOUNTER
Has appt tomorrow. Answered yes to abdominal pain and negative covid test on travel screening. Pt states he has been tested twice in the last month but both tests were negative.  Pt states abdominal pain is not a new symptom and is one of the reasons for ap

## 2021-04-14 NOTE — TELEPHONE ENCOUNTER
Pt has an appt tomorrow to f/u abd pain and get a note for work. He answered yes to abd pain on the travel screen. Pt states that this is not a new issue and has had 2 negative covid tests in the last month.      Please advise if it is ok to keep appt tomor

## 2021-04-15 ENCOUNTER — OFFICE VISIT (OUTPATIENT)
Dept: FAMILY MEDICINE CLINIC | Facility: CLINIC | Age: 56
End: 2021-04-15

## 2021-04-15 VITALS
BODY MASS INDEX: 25.36 KG/M2 | TEMPERATURE: 98 F | HEIGHT: 69 IN | SYSTOLIC BLOOD PRESSURE: 130 MMHG | OXYGEN SATURATION: 99 % | HEART RATE: 98 BPM | DIASTOLIC BLOOD PRESSURE: 78 MMHG | WEIGHT: 171.19 LBS | RESPIRATION RATE: 16 BRPM

## 2021-04-15 DIAGNOSIS — R10.30 LOWER ABDOMINAL PAIN: Primary | ICD-10-CM

## 2021-04-15 DIAGNOSIS — K21.00 GASTROESOPHAGEAL REFLUX DISEASE WITH ESOPHAGITIS WITHOUT HEMORRHAGE: ICD-10-CM

## 2021-04-15 PROCEDURE — 3075F SYST BP GE 130 - 139MM HG: CPT | Performed by: FAMILY MEDICINE

## 2021-04-15 PROCEDURE — 99213 OFFICE O/P EST LOW 20 MIN: CPT | Performed by: FAMILY MEDICINE

## 2021-04-15 PROCEDURE — 3078F DIAST BP <80 MM HG: CPT | Performed by: FAMILY MEDICINE

## 2021-04-15 PROCEDURE — 3008F BODY MASS INDEX DOCD: CPT | Performed by: FAMILY MEDICINE

## 2021-04-15 RX ORDER — AMOXICILLIN AND CLAVULANATE POTASSIUM 875; 125 MG/1; MG/1
1 TABLET, FILM COATED ORAL 2 TIMES DAILY
Qty: 20 TABLET | Refills: 0 | Status: SHIPPED | OUTPATIENT
Start: 2021-04-15 | End: 2021-04-25

## 2021-04-15 NOTE — PROGRESS NOTES
[address]  PROGRESS NOTE  Chief Complaint:   Patient presents with: Follow - Up: not eating, digestion problems, frequent bowel movement      HPI:   This is a 48year old male presents for follow-up on previous visit.   Has started taki Answered  Counseling given: Not Answered         REVIEW OF SYSTEMS:   CONSTITUTIONAL:  Denies unusual weight gain/loss, fever, chills, or fatigue.    EYES: no visual complaints or deficits  HEENT: denies nasal congestion, sinus pain or sore throat; hearing appropriate          ASSESSMENT AND PLAN:   Sky Lyle was seen today for follow - up.     Diagnoses and all orders for this visit:    Lower abdominal pain    Gastroesophageal reflux disease with esophagitis without hemorrhage    Other orders  -     Amoxicilli Generalized hyperhidrosis     Chronic pain     Routine general medical examination at a health care facility     Pre-operative examination     Other psoriasis     Sprain of costal cartilage     Injury of shoulder, left     Acute reaction to stress     Irish

## 2021-04-15 NOTE — PATIENT INSTRUCTIONS
Continue with protonix. Recommend BRAT diet. Start augmentin. Daily probiotics or yogurt. Drink plenty of fluids. Recommend to check labs. Discussed CT scan for further evaluation. Would like to wait due to no insurance.    Discussed possible di

## 2021-04-21 ENCOUNTER — TELEPHONE (OUTPATIENT)
Dept: FAMILY MEDICINE CLINIC | Facility: CLINIC | Age: 56
End: 2021-04-21

## 2021-04-21 NOTE — TELEPHONE ENCOUNTER
since the patient is paying out of pocket for his bloodwork being done at [facility], out of the 5 the doctor has ordered which of the 2 are more important

## 2021-04-21 NOTE — TELEPHONE ENCOUNTER
Spoke to pt verbalized understanding of importance of all, pt will schedule for the 2 doctor has recommended at this time. No further questions.

## 2021-04-26 ENCOUNTER — TELEPHONE (OUTPATIENT)
Dept: FAMILY MEDICINE CLINIC | Facility: CLINIC | Age: 56
End: 2021-04-26

## 2021-04-26 NOTE — TELEPHONE ENCOUNTER
Patient informed of the below results and recommendations. Patient will await further recommendations from Dr. Trammell Resides on Wednesday, 4/28/2021. Please advise.

## 2021-04-26 NOTE — TELEPHONE ENCOUNTER
Stable. Number one of the patient's liver enzymes is minimally out of range otherwise looks normal, few scattered readings with minimal out of range after lab draw.   No significant abnormal findings that I see at this time  If symptoms ongoing while await

## 2021-04-26 NOTE — TELEPHONE ENCOUNTER
See previous note dated 4/21/2021. Patient has to pay out of pocket for labs as he does not have insurance. Patient chose to only get the labs drawn that Dr. Pippa Morris said were most important.

## 2021-04-26 NOTE — TELEPHONE ENCOUNTER
Lab results from [facility] have not been received yet. LM for patient to return call. Called and spoke with Kevin Shields at [facility].  CBC and CMP drawn on 4/22/2021 will be faxed to our office. Patient didn't have the rest of the labs drawn.

## 2021-04-26 NOTE — TELEPHONE ENCOUNTER
Please call [facility] to see if they can add on the other labs which were wanted from Dr. Ruthann Ahumada order.

## 2021-04-28 NOTE — TELEPHONE ENCOUNTER
Patient labs looks okay. Recommend to continue with Protonix as discussed during office visit. Return to clinic if no improvement in symptoms. Go to ER if symptoms gets worse.

## 2021-04-29 ENCOUNTER — TELEPHONE (OUTPATIENT)
Dept: FAMILY MEDICINE CLINIC | Facility: CLINIC | Age: 56
End: 2021-04-29

## 2021-04-29 DIAGNOSIS — R10.30 LOWER ABDOMINAL PAIN: ICD-10-CM

## 2021-04-29 DIAGNOSIS — K21.00 GASTROESOPHAGEAL REFLUX DISEASE WITH ESOPHAGITIS WITHOUT HEMORRHAGE: Primary | ICD-10-CM

## 2021-04-29 NOTE — TELEPHONE ENCOUNTER
Spoke to pt stated still has the same stomach  and bathroom issues that he has since he came to the doctor for his initial visit for this. pt stated he is having liquid diarrhea small amounts. Stool is brown with some mucus, denies blood in stool.  Stomach

## 2021-04-29 NOTE — TELEPHONE ENCOUNTER
Recommend to go to gastroenterologist Dr. Mack Regan for further evaluation and recommendation. Continue with current medication, plenty of fluid along with Gatorade. Return to clinic if any concerns or questions.     Please fax referral and provide patient w

## 2021-04-29 NOTE — TELEPHONE ENCOUNTER
c/o  not sleeping in the last three nights  / unable to eat either      wondering what is going on.   what can Dr recommend ?             please advise

## 2021-04-30 ENCOUNTER — TELEPHONE (OUTPATIENT)
Dept: FAMILY MEDICINE CLINIC | Facility: CLINIC | Age: 56
End: 2021-04-30

## 2021-04-30 NOTE — TELEPHONE ENCOUNTER
Pt states he was given referral for Dr. Saurav Kennedy but cannot get through to office. Pt states he called [phone] and googled another number but neither are working.

## 2021-05-07 ENCOUNTER — OFFICE VISIT (OUTPATIENT)
Dept: FAMILY MEDICINE CLINIC | Facility: CLINIC | Age: 56
End: 2021-05-07
Payer: MEDICAID

## 2021-05-07 ENCOUNTER — LAB ENCOUNTER (OUTPATIENT)
Dept: LAB | Age: 56
End: 2021-05-07
Attending: FAMILY MEDICINE
Payer: MEDICAID

## 2021-05-07 ENCOUNTER — TELEPHONE (OUTPATIENT)
Dept: FAMILY MEDICINE CLINIC | Facility: CLINIC | Age: 56
End: 2021-05-07

## 2021-05-07 VITALS
HEART RATE: 62 BPM | SYSTOLIC BLOOD PRESSURE: 138 MMHG | RESPIRATION RATE: 16 BRPM | WEIGHT: 169.63 LBS | BODY MASS INDEX: 25.12 KG/M2 | TEMPERATURE: 97 F | HEIGHT: 69 IN | OXYGEN SATURATION: 96 % | DIASTOLIC BLOOD PRESSURE: 86 MMHG

## 2021-05-07 DIAGNOSIS — F17.200 SMOKING: ICD-10-CM

## 2021-05-07 DIAGNOSIS — R14.0 ABDOMINAL BLOATING: ICD-10-CM

## 2021-05-07 DIAGNOSIS — R59.1 LYMPHADENOPATHY: ICD-10-CM

## 2021-05-07 DIAGNOSIS — K21.00 GASTROESOPHAGEAL REFLUX DISEASE WITH ESOPHAGITIS WITHOUT HEMORRHAGE: ICD-10-CM

## 2021-05-07 DIAGNOSIS — R19.7 DIARRHEA, UNSPECIFIED TYPE: ICD-10-CM

## 2021-05-07 DIAGNOSIS — F41.9 ANXIETY: ICD-10-CM

## 2021-05-07 DIAGNOSIS — R42 DIZZINESS: ICD-10-CM

## 2021-05-07 DIAGNOSIS — R10.30 LOWER ABDOMINAL PAIN: Primary | ICD-10-CM

## 2021-05-07 PROCEDURE — 84443 ASSAY THYROID STIM HORMONE: CPT | Performed by: FAMILY MEDICINE

## 2021-05-07 PROCEDURE — 80053 COMPREHEN METABOLIC PANEL: CPT | Performed by: FAMILY MEDICINE

## 2021-05-07 PROCEDURE — 82150 ASSAY OF AMYLASE: CPT | Performed by: FAMILY MEDICINE

## 2021-05-07 PROCEDURE — 3079F DIAST BP 80-89 MM HG: CPT | Performed by: FAMILY MEDICINE

## 2021-05-07 PROCEDURE — 99214 OFFICE O/P EST MOD 30 MIN: CPT | Performed by: FAMILY MEDICINE

## 2021-05-07 PROCEDURE — 36415 COLL VENOUS BLD VENIPUNCTURE: CPT | Performed by: FAMILY MEDICINE

## 2021-05-07 PROCEDURE — 3075F SYST BP GE 130 - 139MM HG: CPT | Performed by: FAMILY MEDICINE

## 2021-05-07 PROCEDURE — 3008F BODY MASS INDEX DOCD: CPT | Performed by: FAMILY MEDICINE

## 2021-05-07 PROCEDURE — 83690 ASSAY OF LIPASE: CPT | Performed by: FAMILY MEDICINE

## 2021-05-07 PROCEDURE — 82607 VITAMIN B-12: CPT | Performed by: FAMILY MEDICINE

## 2021-05-07 PROCEDURE — 85025 COMPLETE CBC W/AUTO DIFF WBC: CPT | Performed by: FAMILY MEDICINE

## 2021-05-07 RX ORDER — BUPROPION HYDROCHLORIDE 150 MG/1
150 TABLET ORAL DAILY
Qty: 30 TABLET | Refills: 0 | Status: SHIPPED | OUTPATIENT
Start: 2021-05-07 | End: 2021-06-10

## 2021-05-07 RX ORDER — FAMOTIDINE 20 MG/1
20 TABLET ORAL 2 TIMES DAILY
Qty: 60 TABLET | Refills: 0 | Status: SHIPPED | OUTPATIENT
Start: 2021-05-07 | End: 2021-08-23

## 2021-05-07 RX ORDER — PANTOPRAZOLE SODIUM 40 MG/1
40 TABLET, DELAYED RELEASE ORAL
Qty: 60 TABLET | Refills: 0 | Status: SHIPPED | OUTPATIENT
Start: 2021-05-07 | End: 2021-08-23

## 2021-05-07 NOTE — PROGRESS NOTES
[address]  PROGRESS NOTE  Chief Complaint:   Patient presents with: Follow - Up  Neck Pain      HPI:   This is a 48year old male presents to clinic for follow-up on his lower abdominal pain, bloating, acid reflux.   He continues to [employer] mg total) by mouth daily. 30 tablet 0   • Pantoprazole Sodium (PROTONIX) 40 MG Oral Tab EC Take 1 tablet (40 mg total) by mouth 2 (two) times daily before meals.  60 tablet 0   • famoTIDine 20 MG Oral Tab Take 1 tablet (20 mg total) by mouth 2 (two) times d rhythm, S1 and S2 are normal, no murmurs, rubs or gallops. EXTREMITIES: No edema, no cyanosis, no clubbing, FROM, 2+ dorsalis pedis pulses bilaterally.   ABDOMEN: Soft, nondistended, nontender, bowel sounds normal in all 4 quadrants, no Masses, no hepatosp Future  -     COMP METABOLIC PANEL (14); Future  Smoking    Anxiety    Other orders  -     buPROPion HCl ER, XL, 150 MG Oral Tablet 24 Hr; Take 1 tablet (150 mg total) by mouth daily.  -     Pantoprazole Sodium (PROTONIX) 40 MG Oral Tab EC;  Take 1 tablet ( examination     Other psoriasis     Sprain of costal cartilage     Injury of shoulder, left     Acute reaction to stress     Gastroesophageal reflux disease     Acute pain of right knee     Chronic right-sided low back pain without sciatica     Hemorrhoids

## 2021-05-07 NOTE — TELEPHONE ENCOUNTER
I am not sure of exact timing, may take 2 to 3 weeks. Medication works differently in each individual, there is no exact timing that I can provide him with.

## 2021-05-07 NOTE — PATIENT INSTRUCTIONS
BRAT diet (Bananas, Rice, Applesauce, Toast)  Plenty of fluids and gatorade. Check labs. Schedule CT scan. Start bupropion. Start famotidine along with protonix. See Dr Donato Mckeon. Recommend to quit smoking. Go to ER if symptoms worse.

## 2021-05-08 ENCOUNTER — TELEPHONE (OUTPATIENT)
Dept: FAMILY MEDICINE CLINIC | Facility: CLINIC | Age: 56
End: 2021-05-08

## 2021-05-08 NOTE — TELEPHONE ENCOUNTER
----- Message from Jacinto Urrutia MD sent at 5/8/2021  9:47 AM CDT -----  Please inform patient that his amylase, lipase, B12, CBC is normal.Sodium level is slightly decreased at 134, rest of electrolytes, kidney functions are okay. Liver enzyme very slightl

## 2021-05-12 ENCOUNTER — TELEPHONE (OUTPATIENT)
Dept: FAMILY MEDICINE CLINIC | Facility: CLINIC | Age: 56
End: 2021-05-12

## 2021-05-12 NOTE — TELEPHONE ENCOUNTER
Spoke to pt verbalized understanding of lab results and recommendations. Will follow up with Dr. Loren Padilla.

## 2021-05-12 NOTE — TELEPHONE ENCOUNTER
Please inform patient that CT scan of his abdomen and pelvis shows that he has fatty liver, diverticulosis and also he may have nonspecific colitis.   Recommend to continue with current medication, bland diet and follow-up with gastroenterologist for colono

## 2021-05-14 ENCOUNTER — LABORATORY ENCOUNTER (OUTPATIENT)
Dept: LAB | Age: 56
End: 2021-05-14
Attending: FAMILY MEDICINE
Payer: MEDICAID

## 2021-05-14 DIAGNOSIS — R10.30 LOWER ABDOMINAL PAIN: ICD-10-CM

## 2021-05-14 PROCEDURE — 81003 URINALYSIS AUTO W/O SCOPE: CPT

## 2021-05-20 ENCOUNTER — TELEPHONE (OUTPATIENT)
Dept: FAMILY MEDICINE CLINIC | Facility: CLINIC | Age: 56
End: 2021-05-20

## 2021-05-20 NOTE — TELEPHONE ENCOUNTER
Spoke to pt informed when sees Dr. Ti Mendoza if colonoscopy is needed he will order. No further questions.

## 2021-05-20 NOTE — TELEPHONE ENCOUNTER
Pt would like clarification on when he should have colonoscopy done. States he has appt with Dr. Antonieta Saba (GI dr) on 7/21/21 and pt is unsure if he orders the colonoscopy or if Dr. Pippa Morris ordered one.

## 2021-05-26 ENCOUNTER — TELEPHONE (OUTPATIENT)
Dept: FAMILY MEDICINE CLINIC | Facility: CLINIC | Age: 56
End: 2021-05-26

## 2021-05-26 ENCOUNTER — OFFICE VISIT (OUTPATIENT)
Dept: FAMILY MEDICINE CLINIC | Facility: CLINIC | Age: 56
End: 2021-05-26
Payer: MEDICAID

## 2021-05-26 VITALS
OXYGEN SATURATION: 97 % | WEIGHT: 162 LBS | DIASTOLIC BLOOD PRESSURE: 100 MMHG | TEMPERATURE: 98 F | RESPIRATION RATE: 16 BRPM | SYSTOLIC BLOOD PRESSURE: 140 MMHG | HEART RATE: 111 BPM | BODY MASS INDEX: 23.99 KG/M2 | HEIGHT: 69 IN

## 2021-05-26 DIAGNOSIS — R00.0 TACHYCARDIA: ICD-10-CM

## 2021-05-26 DIAGNOSIS — K21.00 GASTROESOPHAGEAL REFLUX DISEASE WITH ESOPHAGITIS WITHOUT HEMORRHAGE: ICD-10-CM

## 2021-05-26 DIAGNOSIS — I10 ESSENTIAL HYPERTENSION: Primary | ICD-10-CM

## 2021-05-26 DIAGNOSIS — R19.7 DIARRHEA, UNSPECIFIED TYPE: ICD-10-CM

## 2021-05-26 DIAGNOSIS — R10.30 LOWER ABDOMINAL PAIN: ICD-10-CM

## 2021-05-26 PROBLEM — K76.0 FATTY LIVER: Status: ACTIVE | Noted: 2021-05-26

## 2021-05-26 PROCEDURE — 93000 ELECTROCARDIOGRAM COMPLETE: CPT | Performed by: FAMILY MEDICINE

## 2021-05-26 PROCEDURE — 3077F SYST BP >= 140 MM HG: CPT | Performed by: FAMILY MEDICINE

## 2021-05-26 PROCEDURE — 99214 OFFICE O/P EST MOD 30 MIN: CPT | Performed by: FAMILY MEDICINE

## 2021-05-26 PROCEDURE — 3080F DIAST BP >= 90 MM HG: CPT | Performed by: FAMILY MEDICINE

## 2021-05-26 PROCEDURE — 3008F BODY MASS INDEX DOCD: CPT | Performed by: FAMILY MEDICINE

## 2021-05-26 RX ORDER — SACCHAROMYCES BOULARDII 250 MG
250 CAPSULE ORAL DAILY
Qty: 30 CAPSULE | Refills: 2 | Status: SHIPPED | OUTPATIENT
Start: 2021-05-26 | End: 2021-08-23

## 2021-05-26 RX ORDER — PROPRANOLOL HYDROCHLORIDE 10 MG/1
10 TABLET ORAL 2 TIMES DAILY
Qty: 60 TABLET | Refills: 1 | Status: SHIPPED | OUTPATIENT
Start: 2021-05-26 | End: 2021-05-26

## 2021-05-26 RX ORDER — SACCHAROMYCES BOULARDII 250 MG
250 CAPSULE ORAL DAILY
Qty: 30 CAPSULE | Refills: 2 | Status: SHIPPED | OUTPATIENT
Start: 2021-05-26 | End: 2021-05-26

## 2021-05-26 RX ORDER — PROPRANOLOL HYDROCHLORIDE 10 MG/1
10 TABLET ORAL 2 TIMES DAILY
Qty: 60 TABLET | Refills: 1 | Status: SHIPPED | OUTPATIENT
Start: 2021-05-26 | End: 2021-07-09

## 2021-05-26 NOTE — PATIENT INSTRUCTIONS
Continue current medications  Start propranolol, use Florastor. Recommend healthy diet, plenty of fluid. Keep appointment with gastroenterologist.  Krystal Cottrell to emergency room if abdominal pain worse or any sign of dehydration. Off work for 2 weeks.   Return to

## 2021-05-26 NOTE — PROGRESS NOTES
[address]  PROGRESS NOTE  Chief Complaint:   Patient presents with: Follow - Up      HPI:   This is a 48year old male presents to clinic for follow-up on his lower abdominal pain, bloating, acid reflux.   He continues to have diarrhea total) by mouth 2 (two) times daily before meals. 60 tablet 0   • famoTIDine 20 MG Oral Tab Take 1 tablet (20 mg total) by mouth 2 (two) times daily.  60 tablet 0      Ready to quit: Not Answered  Counseling given: Not Answered         REVIEW OF SYSTEMS: pulses bilaterally. ABDOMEN: Soft, nondistended, nontender, bowel sounds normal in all 4 quadrants, no Masses, no hepatosplenomegaly. SKIN: No rashes, no skin lesion, no bruising, good turgor.   MUSCULOSKELETAL: Normal ROM, no joint pain, or muscle weakne conjunctivitis     Low back pain     Contusion of scapular region     Superficial injury of cornea     Dermatitis     Disorder of lipid metabolism     Eczema     Closed rib fracture     Hand injuries     Inguinal hernia     Spasm of muscle     Muscle wasti

## 2021-05-26 NOTE — TELEPHONE ENCOUNTER
Patients medicaitons were sent ot the wrong pharmacy, patients would like Dr Jovani Osborn to send them again to [facility] in [place].

## 2021-05-26 NOTE — TELEPHONE ENCOUNTER
RX's for Propranolol and Florastor sent to [facility] today per Dr. Chari Hampton. Pt is requesting RX's to go to [address]. Pt instructed to request RX transfer from pharmacy to pharmacy. Pt agreed to do so.

## 2021-06-10 ENCOUNTER — TELEPHONE (OUTPATIENT)
Dept: FAMILY MEDICINE CLINIC | Facility: CLINIC | Age: 56
End: 2021-06-10

## 2021-06-10 RX ORDER — BUPROPION HYDROCHLORIDE 150 MG/1
150 TABLET ORAL DAILY
Qty: 30 TABLET | Refills: 2 | Status: SHIPPED | OUTPATIENT
Start: 2021-06-10 | End: 2021-07-09

## 2021-06-10 RX ORDER — BUPROPION HYDROCHLORIDE 150 MG/1
150 TABLET ORAL DAILY
Qty: 30 TABLET | Refills: 2 | Status: SHIPPED | OUTPATIENT
Start: 2021-06-10 | End: 2021-06-10

## 2021-06-10 NOTE — TELEPHONE ENCOUNTER
Pt stated that the script needs to go to [facility] in [facility].   Cancelled script at [facility].

## 2021-07-09 RX ORDER — PROPRANOLOL HYDROCHLORIDE 10 MG/1
10 TABLET ORAL 2 TIMES DAILY
Qty: 60 TABLET | Refills: 0 | Status: SHIPPED | OUTPATIENT
Start: 2021-07-09 | End: 2021-08-23

## 2021-07-09 RX ORDER — BUPROPION HYDROCHLORIDE 150 MG/1
150 TABLET ORAL DAILY
Qty: 30 TABLET | Refills: 0 | Status: SHIPPED | OUTPATIENT
Start: 2021-07-09 | End: 2021-08-23 | Stop reason: ALTCHOICE

## 2021-07-09 NOTE — TELEPHONE ENCOUNTER
patient needs refill on his Bupropion XL 150mg and Propranolol 10mg to [facility] in [place].

## 2021-07-09 NOTE — TELEPHONE ENCOUNTER
Future appt:     Last Appointment with provider:   5/26/2021- Return in about 2 weeks (around 6/9/2021) for follow up.     Last refill: 6/10/21- bupropion [facility]  mg  5/26/21- propanolol [facility] 10 mg    Future Appointments   Date Time Provider Department Ce

## 2021-08-08 ENCOUNTER — TELEPHONE (OUTPATIENT)
Dept: FAMILY MEDICINE CLINIC | Facility: CLINIC | Age: 56
End: 2021-08-08

## 2021-08-08 NOTE — TELEPHONE ENCOUNTER
Patient called answering service about developing a cold and some trouble breathing through his nose yesterday and is scheduled for a colonoscopy tomorrow at 7:15 am at [facility] with Dr. Ziggy Villegas.  States he had a negative Covid test. He tried calling without vania

## 2021-08-23 NOTE — PROGRESS NOTES
[address]  PROGRESS NOTE  Chief Complaint:   Patient presents with: Follow - Up: 3 months       HPI:   This is a 48year old male with anxiety and grief presents for follow-up. Recently patient's daughter passed away.   Patient's anxie gain/loss, fever, chills, or fatigue. EYES:  Denies eye pain, visual loss, blurred vision, double vision or yellow sclerae. INTEGUMENTARY:  Denies rashes, itching, skin lesion, or excessive skin dryness.   CARDIOVASCULAR:  Denies chest pain, chest press ROM, no joint pain, or muscle weakness in all extremity. NEUROLOGICAL:  No deficit, normal gait, strength and tone, sensory intact, normal reflexes.   PSYCHIATRIC: Alert and oriented x 3; affect appropriate, seems anxious, depressed mood      ASSESSMENT A hyperhidrosis     Chronic pain     Routine general medical examination at a health care facility     Pre-operative examination     Other psoriasis     Sprain of costal cartilage     Injury of shoulder, left     Acute reaction to stress     Gastroesophageal

## 2021-08-23 NOTE — PATIENT INSTRUCTIONS
Continue current medications  Recommend fiber supplement and yogurt. Blood pressure stable today. Stop buproprion and start paxil. See a counselor. Recommend healthy diet and exercise. Return to clinic if any concern.

## 2021-09-20 ENCOUNTER — TELEPHONE (OUTPATIENT)
Dept: FAMILY MEDICINE CLINIC | Facility: CLINIC | Age: 56
End: 2021-09-20

## 2021-09-20 NOTE — TELEPHONE ENCOUNTER
Okay to keep appointment as long as there is no fever. Recommend to go to ER if symptoms gets worse before appointment.

## 2021-09-20 NOTE — TELEPHONE ENCOUNTER
Patient has upcoming f/u appointment this Thursday with Dr. Justen Randall to f/u from his 8/23/21 visit. Patient is overall very anxious and upset regarding everything that has been going on in his life.  Patient mentioned things that were noted and discussed check to see if we could see him for a regular appointment or if we needed to see him for resp clinic appointment to rule out covid-19. Please advise.

## 2021-09-23 NOTE — PATIENT INSTRUCTIONS
Increase paxil to 20 mg.   Start xanax. See a counselor. Recommend breathing exercise and meditation. recommend to start working. Use omeprazole daily. Follow up in 1 month.

## 2021-09-23 NOTE — PROGRESS NOTES
[address]  PROGRESS NOTE  Chief Complaint:   Patient presents with: Follow - Up: Depression  Medication Follow-Up: paxil      HPI:   This is a 55year old male with history of anxiety, grief and acid reflux presents for follow-up.   Kena Du Denies unusual weight gain/loss, fever, chills, or fatigue. CARDIOVASCULAR:  Denies chest pain, chest pressure, chest discomfort, palpitations, edema, dyspnea on exertion or at rest.  RESPIRATORY:  Denies shortness of breath, wheezing, cough or sputum. Yadira Gee was seen today for follow - up and medication follow-up.     Diagnoses and all orders for this visit:    Anxiety    Grief    Gastroesophageal reflux disease with esophagitis without hemorrhage    Other orders  -     PARoxetine (PAXIL) 20 MG Oral T shoulder, left     Acute reaction to stress     Gastroesophageal reflux disease     Acute pain of right knee     Chronic right-sided low back pain without sciatica     Hemorrhoids     Anxiety     Essential hypertension     Fatty liver      Jacinto Urrutia MD

## 2021-10-18 ENCOUNTER — TELEPHONE (OUTPATIENT)
Dept: FAMILY MEDICINE CLINIC | Facility: CLINIC | Age: 56
End: 2021-10-18

## 2021-10-18 RX ORDER — PAROXETINE HYDROCHLORIDE 20 MG/1
20 TABLET, FILM COATED ORAL EVERY MORNING
Qty: 30 TABLET | Refills: 0 | Status: SHIPPED | OUTPATIENT
Start: 2021-10-18 | End: 2021-12-17 | Stop reason: ALTCHOICE

## 2021-10-18 RX ORDER — ALPRAZOLAM 0.25 MG/1
0.25 TABLET ORAL 2 TIMES DAILY PRN
Qty: 30 TABLET | Refills: 0 | Status: SHIPPED | OUTPATIENT
Start: 2021-10-18 | End: 2021-11-22

## 2021-10-18 NOTE — TELEPHONE ENCOUNTER
Please see note, need to call pt when med refill is sent    Future appt:     Your appointments     Date & Time Appointment Department [facility])    Oct 22, 2021 10:00 AM CDT Follow Up Visit with Ottoniel Bhatia MD [address] [place] [address] (

## 2021-10-18 NOTE — TELEPHONE ENCOUNTER
Spoke to pt informed that his scripts were refilled.      Pt advised to call back if symptoms persist

## 2021-10-18 NOTE — TELEPHONE ENCOUNTER
Pt calling and states he has been out of his Paxil and Xanax since Friday and now he is shaking. Pt states he has not had food or sleep in 3-4 days due to his anxiety and hard times.  His daughter  a few months ago and his son was recently hospitali

## 2021-10-22 ENCOUNTER — OFFICE VISIT (OUTPATIENT)
Dept: FAMILY MEDICINE CLINIC | Facility: CLINIC | Age: 56
End: 2021-10-22
Payer: MEDICAID

## 2021-10-22 VITALS
HEIGHT: 69 IN | RESPIRATION RATE: 14 BRPM | SYSTOLIC BLOOD PRESSURE: 110 MMHG | OXYGEN SATURATION: 99 % | WEIGHT: 168 LBS | HEART RATE: 63 BPM | TEMPERATURE: 97 F | DIASTOLIC BLOOD PRESSURE: 60 MMHG | BODY MASS INDEX: 24.88 KG/M2

## 2021-10-22 DIAGNOSIS — F41.9 ANXIETY: ICD-10-CM

## 2021-10-22 DIAGNOSIS — R42 DIZZINESS: Primary | ICD-10-CM

## 2021-10-22 DIAGNOSIS — R25.1 TREMOR: ICD-10-CM

## 2021-10-22 DIAGNOSIS — R55 SYNCOPE, UNSPECIFIED SYNCOPE TYPE: ICD-10-CM

## 2021-10-22 PROCEDURE — 3078F DIAST BP <80 MM HG: CPT | Performed by: FAMILY MEDICINE

## 2021-10-22 PROCEDURE — 99214 OFFICE O/P EST MOD 30 MIN: CPT | Performed by: FAMILY MEDICINE

## 2021-10-22 PROCEDURE — 3074F SYST BP LT 130 MM HG: CPT | Performed by: FAMILY MEDICINE

## 2021-10-22 PROCEDURE — 3008F BODY MASS INDEX DOCD: CPT | Performed by: FAMILY MEDICINE

## 2021-10-22 NOTE — PROGRESS NOTES
[facility] [place]  PROGRESS NOTE  Chief Complaint:   Patient presents with:  Medication Follow-Up  Fall: doesnt know how long hes down for  Dizziness      HPI:   This is a 55year old male presents for follow-up, his anxiety has remained Helen given: Not Answered         REVIEW OF SYSTEMS:   CONSTITUTIONAL:  Denies unusual weight gain/loss, fever, chills, or fatigue. EYES:  Denies eye pain, visual loss, blurred vision, double vision or yellow sclerae.    INTEGUMENTARY:  Denies rashes, itching, or muscle weakness in all extremity. NEUROLOGICAL:  No deficit, normal gait, strength and tone, sensory intact, normal reflexes.   PSYCHIATRIC: Alert and oriented x 3; affect appropriate, no depressed mood or anxiety      ASSESSMENT AND PLAN:   Eda Mcgee scapular region     Superficial injury of cornea     Dermatitis     Disorder of lipid metabolism     Eczema     Closed rib fracture     Hand injuries     Inguinal hernia     Spasm of muscle     Muscle wasting     Neuromyositis     Tobacco use disorder

## 2021-10-22 NOTE — PATIENT INSTRUCTIONS
Recommend to quit smoking. Healthy diet and exercise. Drink plenty of fluids.     Check labs at [facility].   [facility]  Address: [address] [facility] [address], [address]  Phone: [phone]    Schedule ct scan at hospital.   Return to clinic i

## 2021-10-29 ENCOUNTER — TELEPHONE (OUTPATIENT)
Dept: CASE MANAGEMENT | Age: 56
End: 2021-10-29

## 2021-10-29 NOTE — TELEPHONE ENCOUNTER
Hello     Pt CT of head and moon has been denied by the [employer].      You can appeal this decision by calling [phone]    Please advise plan of care    Thank you,  [facility]   Referral specialist

## 2021-10-29 NOTE — TELEPHONE ENCOUNTER
To do a peer to peer you have 7 calendar days.    Ph. [id number] option 4  Reference #[id number]    Thank you,  Jake Razo

## 2021-10-30 NOTE — TELEPHONE ENCOUNTER
Please inform patient that CT scan has been denied by his insurance. If his symptoms gets worse then recommend to go to emergency room and have a CT scan done.

## 2021-11-08 ENCOUNTER — TELEPHONE (OUTPATIENT)
Dept: FAMILY MEDICINE CLINIC | Facility: CLINIC | Age: 56
End: 2021-11-08

## 2021-11-08 RX ORDER — LORAZEPAM 1 MG/1
1 TABLET ORAL 2 TIMES DAILY PRN
Qty: 30 TABLET | Refills: 0 | Status: SHIPPED | OUTPATIENT
Start: 2021-11-08 | End: 2021-11-22

## 2021-11-08 NOTE — TELEPHONE ENCOUNTER
I will send in a refill for Ativan. Please come in and discuss long-term care issues when an appointment is available.

## 2021-11-08 NOTE — TELEPHONE ENCOUNTER
Patient informed of the RF given and the below recommendations. Stressed to patient to keep appt scheduled.      Future Appointments   Date Time Provider Amadeo Cyndi   11/12/2021 11:30 AM Marko Dwyer MD [facility] [place] [facility]

## 2021-11-08 NOTE — TELEPHONE ENCOUNTER
Kenn Serrano in [facility]. Patient is completely out. Call only with questions or problems.

## 2021-11-08 NOTE — TELEPHONE ENCOUNTER
Patient states he was in the ER on 11/1/2021 for \"shakes\" and was prescribed Lorazepam 1 MG every 6 hrs. Patient states he is completely out of the medication as of yesterday and he is starting to shake again. Patient asking for a RF.       Appt schedul

## 2021-11-12 PROBLEM — K52.9 CHRONIC DIARRHEA: Status: ACTIVE | Noted: 2021-07-21

## 2021-11-22 PROBLEM — F10.10 ALCOHOL ABUSE: Status: ACTIVE | Noted: 2021-11-22

## 2021-11-22 NOTE — TELEPHONE ENCOUNTER
One refill of lorazepam sent, [place]  reviewed. Please call patient and schedule sooner follow up appointment for additional refills, one refill was given by Dr. Michelle Hua with a late cancellation that day for follow up appointment.     No additional refil

## 2021-11-22 NOTE — TELEPHONE ENCOUNTER
Lorazepam: 11/8/21     Return in about 1 week (around 10/29/2021) for follow up. Future appt:     Your appointments     Date & Time Appointment Department [facility])    Dec 17, 2021  3:00 PM CST Follow up - Extended with Juan Pablo Keenan  [facility]

## 2021-12-02 RX ORDER — ALPRAZOLAM 0.25 MG/1
TABLET ORAL
Qty: 30 TABLET | Refills: 0 | OUTPATIENT
Start: 2021-12-02

## 2021-12-02 NOTE — TELEPHONE ENCOUNTER
Alprazolam denied was changed to lorazepam see phone notes regarding follow up and narcotic prescriptions.

## 2021-12-03 ENCOUNTER — TELEPHONE (OUTPATIENT)
Dept: FAMILY MEDICINE CLINIC | Facility: CLINIC | Age: 56
End: 2021-12-03

## 2021-12-03 DIAGNOSIS — F41.9 ANXIETY: ICD-10-CM

## 2021-12-03 RX ORDER — LORAZEPAM 1 MG/1
1 TABLET ORAL 2 TIMES DAILY PRN
Qty: 30 TABLET | Refills: 0 | Status: SHIPPED | OUTPATIENT
Start: 2021-12-03 | End: 2021-12-17

## 2021-12-03 NOTE — TELEPHONE ENCOUNTER
Future appt:     Your appointments     Date & Time Appointment Department [facility])    Dec 17, 2021  3:00 PM CST Follow up - Extended with Janette Goodman MD [address] [place] [address] ([facility])            [employer]

## 2021-12-17 DIAGNOSIS — F41.9 ANXIETY: ICD-10-CM

## 2021-12-17 PROBLEM — F32.A DEPRESSION: Status: ACTIVE | Noted: 2021-12-17

## 2021-12-17 PROBLEM — E87.6 HYPOKALEMIA: Status: ACTIVE | Noted: 2021-12-17

## 2021-12-17 PROBLEM — R25.1 TREMOR: Status: ACTIVE | Noted: 2021-12-17

## 2021-12-17 PROBLEM — E83.42 HYPOMAGNESEMIA: Status: ACTIVE | Noted: 2021-12-17

## 2021-12-17 RX ORDER — LORAZEPAM 1 MG/1
TABLET ORAL
Qty: 30 TABLET | Refills: 0 | Status: SHIPPED | OUTPATIENT
Start: 2021-12-17 | End: 2022-01-02

## 2021-12-17 NOTE — PATIENT INSTRUCTIONS
Continue current medications  Start lexapro, magnesium, thiamin and folic acid. Restart omeprazole, famotidine. Cut back and stop alcohol use and smoking.    Check labs at [facility].   [facility]  Address: [address] [facility], [address] [place] [address]

## 2021-12-17 NOTE — TELEPHONE ENCOUNTER
Future appt:     Your appointments     Date & Time Appointment Department [facility])    Dec 17, 2021  3:00 PM CST Follow up - Extended with Stephna Nava MD [address] [place], [place] [address]Paul ([facility]

## 2021-12-17 NOTE — PROGRESS NOTES
[address]  PROGRESS NOTE  Chief Complaint:   Patient presents with:   Follow - Up  Numbness: bilateral feet      HPI:   This is a 55year old male presents to clinic for follow-up from recent ER visit at [facility] due to anxiet Capsule Delayed Release Take 1 capsule (40 mg total) by mouth daily. 90 capsule 1   • famotidine 20 MG Oral Tab Take 1 tablet (20 mg total) by mouth 2 (two) times daily. 60 tablet 2   • magnesium 250 MG Oral Tab Take 1 tablet (250 mg total) by mouth daily. is 24.34 kg/m² as calculated from the following:    Height as of this encounter: 5' 9\" (1.753 m). Weight as of this encounter: 164 lb 12.8 oz (74.8 kg). Vital signs reviewed.   Physical Exam:  GEN:  Patient is alert, awake and oriented, well developed T4; Future  -     CBC WITH DIFFERENTIAL WITH PLATELET    Other depression  -     escitalopram 10 MG Oral Tab; Take 1 tablet (10 mg total) by mouth daily.     Gastroesophageal reflux disease with esophagitis without hemorrhage  -     VITAMIN B12    Numbness Maintenance:  COVID-19 Vaccine(1) Never done  Pneumococcal Vaccine: Birth to 64yrs(1 of 2 - PPSV23) Never done  PSA Never done  Zoster Vaccines(1 of 2) Never done  Annual Physical due on 06/21/2018  Influenza Vaccine(1) Never done    Patient/Caregiver Educ

## 2021-12-27 ENCOUNTER — TELEPHONE (OUTPATIENT)
Dept: FAMILY MEDICINE CLINIC | Facility: CLINIC | Age: 56
End: 2021-12-27

## 2021-12-31 DIAGNOSIS — F41.9 ANXIETY: ICD-10-CM

## 2022-01-02 RX ORDER — LORAZEPAM 1 MG/1
TABLET ORAL
Qty: 30 TABLET | Refills: 0 | Status: SHIPPED | OUTPATIENT
Start: 2022-01-02 | End: 2022-01-18

## 2022-01-03 NOTE — TELEPHONE ENCOUNTER
----- Message from Dang Graff sent at 1/3/2022  8:45 AM CST -----  Pt is out of lorazapam .  Pt has been shaking and wants to get today

## 2022-01-04 ENCOUNTER — TELEPHONE (OUTPATIENT)
Dept: FAMILY MEDICINE CLINIC | Facility: CLINIC | Age: 57
End: 2022-01-04

## 2022-01-04 NOTE — TELEPHONE ENCOUNTER
Please inform patient that his recent test ankle brachial index is normal bilaterally. Recommend to return to clinic if he continues to have numbness or tingling in his extremities.

## 2022-01-18 DIAGNOSIS — F41.9 ANXIETY: ICD-10-CM

## 2022-01-18 RX ORDER — LORAZEPAM 1 MG/1
TABLET ORAL
Qty: 30 TABLET | Refills: 0 | Status: SHIPPED | OUTPATIENT
Start: 2022-01-18 | End: 2022-02-01

## 2022-01-18 NOTE — TELEPHONE ENCOUNTER
Future appt:     Your appointments     Date & Time Appointment Department [facility])    Jan 20, 2022 10:30 AM CST Follow Up Visit with Cuca Lopez MD [address] [place] [address], [facility]            [employer]

## 2022-01-20 ENCOUNTER — OFFICE VISIT (OUTPATIENT)
Dept: FAMILY MEDICINE CLINIC | Facility: CLINIC | Age: 57
End: 2022-01-20
Payer: MEDICAID

## 2022-01-20 VITALS
SYSTOLIC BLOOD PRESSURE: 138 MMHG | BODY MASS INDEX: 25.62 KG/M2 | RESPIRATION RATE: 24 BRPM | HEIGHT: 69 IN | TEMPERATURE: 98 F | WEIGHT: 173 LBS | DIASTOLIC BLOOD PRESSURE: 98 MMHG | HEART RATE: 104 BPM | OXYGEN SATURATION: 96 %

## 2022-01-20 DIAGNOSIS — R20.0 NUMBNESS AND TINGLING OF BOTH LEGS: Primary | ICD-10-CM

## 2022-01-20 DIAGNOSIS — F32.89 OTHER DEPRESSION: ICD-10-CM

## 2022-01-20 DIAGNOSIS — F41.9 ANXIETY: ICD-10-CM

## 2022-01-20 DIAGNOSIS — R25.1 TREMOR: ICD-10-CM

## 2022-01-20 DIAGNOSIS — M54.50 CHRONIC LEFT-SIDED LOW BACK PAIN WITHOUT SCIATICA: ICD-10-CM

## 2022-01-20 DIAGNOSIS — G89.29 CHRONIC LEFT-SIDED LOW BACK PAIN WITHOUT SCIATICA: ICD-10-CM

## 2022-01-20 DIAGNOSIS — I10 ESSENTIAL HYPERTENSION: ICD-10-CM

## 2022-01-20 DIAGNOSIS — R20.2 NUMBNESS AND TINGLING OF BOTH LEGS: Primary | ICD-10-CM

## 2022-01-20 PROCEDURE — 99214 OFFICE O/P EST MOD 30 MIN: CPT | Performed by: FAMILY MEDICINE

## 2022-01-20 PROCEDURE — 3080F DIAST BP >= 90 MM HG: CPT | Performed by: FAMILY MEDICINE

## 2022-01-20 PROCEDURE — 3075F SYST BP GE 130 - 139MM HG: CPT | Performed by: FAMILY MEDICINE

## 2022-01-20 PROCEDURE — 3008F BODY MASS INDEX DOCD: CPT | Performed by: FAMILY MEDICINE

## 2022-01-20 RX ORDER — ESCITALOPRAM OXALATE 20 MG/1
20 TABLET ORAL DAILY
Qty: 90 TABLET | Refills: 0 | Status: SHIPPED | OUTPATIENT
Start: 2022-01-20

## 2022-01-20 NOTE — PROGRESS NOTES
[facility] [place]  PROGRESS NOTE  Chief Complaint:   Patient presents with:  Pain: gut, back   Numbness: both feet, ankles, and shins  Medication Request: escitalopram      HPI:   This is a 55year old male presents to clinic for follow-up.   He Not Answered  Counseling given: Not Answered         REVIEW OF SYSTEMS:   CONSTITUTIONAL:  Denies unusual weight gain/loss, fever, chills, or fatigue. INTEGUMENTARY:  Denies rashes, itching, skin lesion, or excessive skin dryness.   CARDIOVASCULAR:  Denies appropriate, no depressed mood or anxiety      ASSESSMENT AND PLAN:   Natalio Judson was seen today for pain, numbness and medication request.    Diagnoses and all orders for this visit:    Numbness and tingling of both legs  -     NEURO - INTERNAL    Tremor  - injuries     Inguinal hernia     Spasm of muscle     Muscle wasting     Neuromyositis     Tobacco use disorder     Generalized hyperhidrosis     Chronic pain     Routine general medical examination at a health care facility     Pre-operative examination

## 2022-01-20 NOTE — PATIENT INSTRUCTIONS
Blood pressure elevated today. Advice low salt diet and exercise. Monitor your blood pressure. Return to clinic if systolic blood pressure more than 654 or diastolic more than 007. Start metoprolol.    Schedule appointment with neurologist.   Check labs

## 2022-01-24 ENCOUNTER — TELEPHONE (OUTPATIENT)
Dept: FAMILY MEDICINE CLINIC | Facility: CLINIC | Age: 57
End: 2022-01-24

## 2022-01-24 NOTE — TELEPHONE ENCOUNTER
RE:   summary from last visit       RE: Blood draw questions       Future Appointments   Date Time Provider Amadeo Hanna   2/2/2022  2:40 PM Stefania Esquivel MD [facility]   2/22/2022  9:00 AM Al Cha MD [facility] [place] [facility]

## 2022-02-01 RX ORDER — LORAZEPAM 1 MG/1
TABLET ORAL
Qty: 30 TABLET | Refills: 0 | Status: SHIPPED | OUTPATIENT
Start: 2022-02-01 | End: 2022-02-17

## 2022-02-01 NOTE — TELEPHONE ENCOUNTER
Future appt: Your appointments     Date & Time Appointment Department [facility])    Feb 02, 2022  2:40 PM CST Exam - New Patient with MD Beulah Kaur 26 ([facility])        Feb 22, 2022  9:00 AM CST Follow Up Visit with Popeye Biggs MD [address] [place] [address] ([place])            [address] [id number]  [address] [place] [address] [place]  [facility] [id number]  [phone]        Last Appointment with provider:   1/20/2022 for follow up, numbness and tingling in legs. Last appointment at [facility]:  1/20/2022  No results found for: CHOLEST, HDL, LDL, TRIGLY, TRIG  No results found for: EAG, A1C  Lab Results   Component Value Date    TSH 0.819 05/07/2021       No follow-ups on file.

## 2022-02-03 ENCOUNTER — OFFICE VISIT (OUTPATIENT)
Dept: NEUROLOGY | Facility: CLINIC | Age: 57
End: 2022-02-03
Payer: MEDICAID

## 2022-02-03 VITALS
DIASTOLIC BLOOD PRESSURE: 100 MMHG | HEIGHT: 69 IN | BODY MASS INDEX: 25.62 KG/M2 | RESPIRATION RATE: 16 BRPM | WEIGHT: 173 LBS | SYSTOLIC BLOOD PRESSURE: 132 MMHG | HEART RATE: 89 BPM

## 2022-02-03 DIAGNOSIS — R20.2 NUMBNESS AND TINGLING OF BOTH LEGS: ICD-10-CM

## 2022-02-03 DIAGNOSIS — R29.2 HYPER REFLEXIA: ICD-10-CM

## 2022-02-03 DIAGNOSIS — R25.8 CLONUS: ICD-10-CM

## 2022-02-03 DIAGNOSIS — R20.0 NUMBNESS AND TINGLING IN BOTH HANDS: ICD-10-CM

## 2022-02-03 DIAGNOSIS — R20.2 NUMBNESS AND TINGLING IN BOTH HANDS: ICD-10-CM

## 2022-02-03 DIAGNOSIS — R20.0 NUMBNESS AND TINGLING OF BOTH LEGS: ICD-10-CM

## 2022-02-03 DIAGNOSIS — R27.0 ATAXIA: Primary | ICD-10-CM

## 2022-02-03 PROCEDURE — 3080F DIAST BP >= 90 MM HG: CPT | Performed by: OTHER

## 2022-02-03 PROCEDURE — 3008F BODY MASS INDEX DOCD: CPT | Performed by: OTHER

## 2022-02-03 PROCEDURE — 99244 OFF/OP CNSLTJ NEW/EST MOD 40: CPT | Performed by: OTHER

## 2022-02-03 PROCEDURE — 3075F SYST BP GE 130 - 139MM HG: CPT | Performed by: OTHER

## 2022-02-03 RX ORDER — GABAPENTIN 300 MG/1
CAPSULE ORAL
Qty: 90 CAPSULE | Refills: 2 | Status: SHIPPED | OUTPATIENT
Start: 2022-02-03 | End: 2022-03-31

## 2022-02-04 ENCOUNTER — TELEPHONE (OUTPATIENT)
Dept: FAMILY MEDICINE CLINIC | Facility: CLINIC | Age: 57
End: 2022-02-04

## 2022-02-04 NOTE — TELEPHONE ENCOUNTER
not having  any luck getting through to [facility] to schedule PT    /   all  phone #s   are unavailable      please advise      Future Appointments   Date Time Provider Amadeo Hanna   2/9/2022  2:00 PM Miko Rodriguez MD [facility]ozada   2/22/2022  9:00 AM Bee Hendrickson MD [facility] [place] [facility] Eugenia Mcintosh

## 2022-02-09 ENCOUNTER — PROCEDURE VISIT (OUTPATIENT)
Dept: NEUROLOGY | Facility: CLINIC | Age: 57
End: 2022-02-09
Payer: MEDICAID

## 2022-02-09 ENCOUNTER — TELEPHONE (OUTPATIENT)
Dept: NEUROLOGY | Facility: CLINIC | Age: 57
End: 2022-02-09

## 2022-02-09 DIAGNOSIS — R20.2 NUMBNESS AND TINGLING OF BOTH LEGS: ICD-10-CM

## 2022-02-09 DIAGNOSIS — R20.2 NUMBNESS AND TINGLING IN BOTH HANDS: ICD-10-CM

## 2022-02-09 DIAGNOSIS — R20.0 NUMBNESS AND TINGLING OF BOTH LEGS: ICD-10-CM

## 2022-02-09 DIAGNOSIS — R27.0 ATAXIA: Primary | ICD-10-CM

## 2022-02-09 DIAGNOSIS — R20.0 NUMBNESS AND TINGLING IN BOTH HANDS: ICD-10-CM

## 2022-02-09 DIAGNOSIS — R25.8 CLONUS: ICD-10-CM

## 2022-02-09 LAB — VITAMIN B12: 430 PG/ML (ref 200–1100)

## 2022-02-09 PROCEDURE — 95911 NRV CNDJ TEST 9-10 STUDIES: CPT | Performed by: OTHER

## 2022-02-09 PROCEDURE — 95886 MUSC TEST DONE W/N TEST COMP: CPT | Performed by: OTHER

## 2022-02-14 ENCOUNTER — TELEPHONE (OUTPATIENT)
Dept: NEUROLOGY | Facility: CLINIC | Age: 57
End: 2022-02-14

## 2022-02-14 NOTE — TELEPHONE ENCOUNTER
Patient is asking about MRI orders discussed during 2/9/2022 OV. Would like to do MRI At [facility].    Routed to provider.

## 2022-02-15 NOTE — TELEPHONE ENCOUNTER
MRI brain, cervical/thoracic spine ordered to evaluate for myelopathy / ischemia, secondary pathology;     Prefer to be done at [facility] but if not able to be done, need discs and reports sent to office when done if external

## 2022-02-16 NOTE — TELEPHONE ENCOUNTER
Patient calling, advised he would like to go to [facility] for MRI. Please ensure that PA is done for Dottie Rascon. Advised that at last appointment, Dr. Dary Girard talked about the possibility of doing imaging of his lumbar spine. Patient notes that his current PCP is helping to treat his lower back. Advised that he has metal rods in lower back and wants to make sure our nurses are aware. When PA approved, can send order [facility] by fax to:   [phone]  Or  [phone]    Please advise.

## 2022-02-16 NOTE — TELEPHONE ENCOUNTER
Faxed authorized orders to [facility] @ [phone] with message regarding metal rods in lower back per patient request.  Confirmation fax received. Spoke with patient to inform orders authorized and faxed to [facility].

## 2022-02-17 RX ORDER — LORAZEPAM 1 MG/1
TABLET ORAL
Qty: 30 TABLET | Refills: 0 | Status: SHIPPED | OUTPATIENT
Start: 2022-02-17 | End: 2022-03-04

## 2022-02-17 NOTE — TELEPHONE ENCOUNTER
Future appt: Your appointments     Date & Time Appointment Department [facility])    Feb 22, 2022  9:00 AM CST Follow Up Visit with Alex Adams MD [address] [facility] [place] ([facility])            [facility] [id number]  [phone]            Last Appointment with provider:   1/20/2022 - numbness and tingling  Last appointment at [facility]:  1/20/2022      Last px - 6/21/2017        No results found for: CHOLEST, HDL, LDL, TRIGLY, TRIG  No results found for: EAG, A1C  Lab Results   Component Value Date    TSH 0.819 05/07/2021       No follow-ups on file.

## 2022-02-28 ENCOUNTER — TELEPHONE (OUTPATIENT)
Dept: FAMILY MEDICINE CLINIC | Facility: CLINIC | Age: 57
End: 2022-02-28

## 2022-02-28 RX ORDER — DIAZEPAM 5 MG/1
5 TABLET ORAL ONCE
Qty: 1 TABLET | Refills: 0 | Status: SHIPPED | OUTPATIENT
Start: 2022-02-28 | End: 2022-02-28

## 2022-02-28 NOTE — TELEPHONE ENCOUNTER
Has MRI scheduled for today at 6 pm but is claustrophobic. Would like to know if  can call in script for valium to [facility] in [place].

## 2022-03-03 ENCOUNTER — TELEPHONE (OUTPATIENT)
Dept: NEUROLOGY | Facility: CLINIC | Age: 57
End: 2022-03-03

## 2022-03-03 NOTE — TELEPHONE ENCOUNTER
Had Received MRI Cervical MRI Brain and MRI Thoracic Reports from [address] [place] [address] at [address] 02/28/22    Report placed on the nurses bin to review. Copy was sent to scan.

## 2022-03-04 RX ORDER — LORAZEPAM 1 MG/1
1 TABLET ORAL DAILY PRN
Qty: 30 TABLET | Refills: 0 | Status: SHIPPED | OUTPATIENT
Start: 2022-03-04 | End: 2022-03-31 | Stop reason: ALTCHOICE

## 2022-03-04 NOTE — TELEPHONE ENCOUNTER
Future appt: Your appointments     Date & Time Appointment Department [facility])    Mar 10, 2022  3:30 PM CST Follow Up Visit with Cristobal Stubbs MD [address] [place] [address] ([facility])            [address] [place] [address]  [phone]         Last Appointment with provider:   1/20/2022- advised Return in about 1 month (around 2/20/2022) for follow up. Last refill: 2/17/22- lorazepam    No results found for: CHOLEST, HDL, LDL, TRIGLY, TRIG  No results found for: EAG, A1C  Lab Results   Component Value Date    TSH 0.819 05/07/2021       No follow-ups on file.

## 2022-03-04 NOTE — TELEPHONE ENCOUNTER
Patient calling, asking if we received information from [place]. Confirmed we had, and that it is out to Dr. Saúl García for review. Please call back with MRI results when available.

## 2022-03-07 NOTE — TELEPHONE ENCOUNTER
Called patient to discuss results    Imaging reports reviewed but no discs available to view independently    Per report    2/28/2022  MRI brain: no acute findings but mild atrophy and T2/FLAIR white matter changes in deep white matter    MRI cervical spine:   Motion degraded - C3/4 broad based left paracentral disc osteophyte complex and moderate canal narrowing, C4/5 mild canal narrowing, C5/6 mild to moderate canal narrowing, C6/7 mild canal narrowing; overall diffuse multilevel degenerative changes noted but no signal change reported     MRI thoracic spine  - small R paracentral disc protrusion T9/10; mild multilevel disc bulging and moderate R foraminal narrowing T11/12; normal signal in thoracic cord    No fractures noted throughout     Advised patient no discs available to review and no comparison studies available - could contribute to his symptoms but may also be chronic as he states many years ago, he was told he had \"the back of an [de-identified] yr old. \"    Advised to have discs sent to office for review -states he will

## 2022-03-18 DIAGNOSIS — F41.9 ANXIETY: ICD-10-CM

## 2022-03-18 RX ORDER — LORAZEPAM 1 MG/1
TABLET ORAL
Qty: 30 TABLET | Refills: 0 | OUTPATIENT
Start: 2022-03-18

## 2022-03-18 RX ORDER — ESCITALOPRAM OXALATE 20 MG/1
TABLET ORAL
Qty: 90 TABLET | Refills: 0 | Status: SHIPPED | OUTPATIENT
Start: 2022-03-18 | End: 2022-03-21

## 2022-03-18 NOTE — TELEPHONE ENCOUNTER
Future appt: Your appointments     Date & Time Appointment Department [facility])    Mar 28, 2022  3:00 PM CDT Follow Up Visit with Popeye Biggs MD [address] [place] [address] ([facility])            [address] [place] [address] Aide  [facility] [address] [id number]  [phone]        Last Appointment with provider:   1/20/2022  Last appointment at [facility]:  1/20/2022  No results found for: CHOLEST, HDL, LDL, TRIGLY, TRIG  No results found for: EAG, A1C  Lab Results   Component Value Date    TSH 0.819 05/07/2021     Last RF:  3/4/2022    No follow-ups on file.

## 2022-03-18 NOTE — TELEPHONE ENCOUNTER
Spoke to pharmacy stated that pt got these refills:      Lorazepam #30 on 3/5/22  Escitalopram #90 on 1/26/22

## 2022-03-18 NOTE — TELEPHONE ENCOUNTER
Spoke to pt stated that he only has 3 lorazepam and 5 of the Escitalopram left. Pt stated that he thinks he has been taking 2 of the lorazepam 1 morning and 1 at night, stated that he used to take them that way. He isnt sure what he did with the Escitalopram.    Informed pt that would see how to proceed with refills.  Pt stated he is wanting to know if he can wean off the Escitalopram?     Please advise

## 2022-03-18 NOTE — TELEPHONE ENCOUNTER
LM for pt to call back.  Last script was completed 1/22 for # 90 should have enough to get past upcoming appt    Future Appointments   Date Time Provider Amadeo Hanna   3/28/2022  3:00 PM Richard Garvey MD [facility]

## 2022-03-18 NOTE — TELEPHONE ENCOUNTER
Spoke to pt, stated he is almost out of Escitalopram. Informed pt to check medications as last script that was sent should get him through most of April. Pt also mentioned his lorazepam and being almost out of that.  Advised to check that also as last script was done 3/4/22 for #30    Pt will check and call us back

## 2022-03-18 NOTE — TELEPHONE ENCOUNTER
Patient is taking regular Xanax and you should continue to use Lexapro. I recommend that we stop Xanax and continue with Lexapro. We will discuss further at time of appointment. I have sent prescription of Lexapro.

## 2022-03-21 ENCOUNTER — TELEPHONE (OUTPATIENT)
Dept: FAMILY MEDICINE CLINIC | Facility: CLINIC | Age: 57
End: 2022-03-21

## 2022-03-21 RX ORDER — BUSPIRONE HYDROCHLORIDE 5 MG/1
5 TABLET ORAL 2 TIMES DAILY
Qty: 60 TABLET | Refills: 3 | Status: SHIPPED | OUTPATIENT
Start: 2022-03-21

## 2022-03-21 RX ORDER — ESCITALOPRAM OXALATE 20 MG/1
20 TABLET ORAL DAILY
Qty: 90 TABLET | Refills: 1 | Status: SHIPPED | OUTPATIENT
Start: 2022-03-21

## 2022-03-21 NOTE — TELEPHONE ENCOUNTER
Spoke to pt who stated he is trying to get his citalopram refilled but [facility] does not have the rx. Advised pt rx was sent and pharmacist confirmed receipt. Pt is requesting a refill too soon which is why it is not being covered. Advised pt last  was 90 day supply on 1/26/22. Pt stated he is out and was denied lorazepam as well. Pt expressed concerns with stopping the medications abruptly because he was told that needs to be weaned off. Pt stated he is not doing well today and needs something to help his anxiety. Advised pt KM is not in the office today, I will check with another provider in the office and we will let pt know.

## 2022-03-21 NOTE — TELEPHONE ENCOUNTER
I will send in a new prescription for buspirone. Start 5 mg twice a day. Call with a progress report in 3 to 4 days. This may potentially cause some dizziness. I will also resend the Escitalopram for him.

## 2022-03-21 NOTE — TELEPHONE ENCOUNTER
See Rx sent on 3/18/2022. Spoke with pharmacist.  Confirmed prescription received. Was informed that insurance will not cover the medication until 4/6/2022 because patient pciked up a 90 day supply on 1/26/2022. LM for patient to return call.

## 2022-03-28 ENCOUNTER — TELEPHONE (OUTPATIENT)
Dept: NEUROLOGY | Facility: CLINIC | Age: 57
End: 2022-03-28

## 2022-03-28 NOTE — TELEPHONE ENCOUNTER
Uploaded [facility] on 3/28/22. Called pt to see if it should be mailed to home address.   Pt wants it mailed and address was verified

## 2022-03-30 ENCOUNTER — TELEPHONE (OUTPATIENT)
Dept: FAMILY MEDICINE CLINIC | Facility: CLINIC | Age: 57
End: 2022-03-30

## 2022-03-30 NOTE — TELEPHONE ENCOUNTER
Would like to know if he needs to reschedule f/u appt tomorrow due to outstanding labs / orders.  (Received letter in mail stating he has outstanding labs and CT scan)    Future Appointments   Date Time Provider Amadeo Hanna   3/31/2022  1:30 PM Trish Lala MD [facility]

## 2022-03-30 NOTE — TELEPHONE ENCOUNTER
Spoke to pt wanted to know if should cancel upcoming appt and move it later due to labs.   Informed pt that ok to keep appt and will discuss the labs that are needed    No further questions

## 2022-03-31 ENCOUNTER — OFFICE VISIT (OUTPATIENT)
Dept: FAMILY MEDICINE CLINIC | Facility: CLINIC | Age: 57
End: 2022-03-31
Payer: MEDICAID

## 2022-03-31 VITALS
TEMPERATURE: 97 F | WEIGHT: 170 LBS | RESPIRATION RATE: 18 BRPM | HEIGHT: 69 IN | OXYGEN SATURATION: 99 % | DIASTOLIC BLOOD PRESSURE: 78 MMHG | SYSTOLIC BLOOD PRESSURE: 102 MMHG | HEART RATE: 62 BPM | BODY MASS INDEX: 25.18 KG/M2

## 2022-03-31 DIAGNOSIS — R20.0 NUMBNESS AND TINGLING OF BOTH LEGS: Primary | ICD-10-CM

## 2022-03-31 DIAGNOSIS — R93.7 ABNORMAL MRI, THORACIC SPINE: ICD-10-CM

## 2022-03-31 DIAGNOSIS — R20.2 NUMBNESS AND TINGLING IN BOTH HANDS: ICD-10-CM

## 2022-03-31 DIAGNOSIS — R20.0 NUMBNESS AND TINGLING IN BOTH HANDS: ICD-10-CM

## 2022-03-31 DIAGNOSIS — F32.89 OTHER DEPRESSION: ICD-10-CM

## 2022-03-31 DIAGNOSIS — Z98.1 S/P LUMBAR FUSION: ICD-10-CM

## 2022-03-31 DIAGNOSIS — R20.2 NUMBNESS AND TINGLING OF BOTH LEGS: Primary | ICD-10-CM

## 2022-03-31 DIAGNOSIS — R93.7 ABNORMAL MRI, CERVICAL SPINE: ICD-10-CM

## 2022-03-31 DIAGNOSIS — M54.16 LUMBAR RADICULOPATHY: ICD-10-CM

## 2022-03-31 PROBLEM — F10.10 ALCOHOL ABUSE: Status: RESOLVED | Noted: 2021-11-22 | Resolved: 2022-03-31

## 2022-03-31 PROCEDURE — 99214 OFFICE O/P EST MOD 30 MIN: CPT | Performed by: FAMILY MEDICINE

## 2022-03-31 PROCEDURE — 3078F DIAST BP <80 MM HG: CPT | Performed by: FAMILY MEDICINE

## 2022-03-31 PROCEDURE — 3074F SYST BP LT 130 MM HG: CPT | Performed by: FAMILY MEDICINE

## 2022-03-31 PROCEDURE — 3008F BODY MASS INDEX DOCD: CPT | Performed by: FAMILY MEDICINE

## 2022-03-31 RX ORDER — GABAPENTIN 400 MG/1
400 CAPSULE ORAL 3 TIMES DAILY
Qty: 90 CAPSULE | Refills: 2 | Status: SHIPPED | OUTPATIENT
Start: 2022-03-31

## 2022-03-31 RX ORDER — METHYLPREDNISOLONE 4 MG/1
TABLET ORAL
Qty: 21 EACH | Refills: 0 | Status: SHIPPED | OUTPATIENT
Start: 2022-03-31

## 2022-03-31 NOTE — PATIENT INSTRUCTIONS
Continue current medications  Increase gabapentin to 400 mg  three times a day. Schedule CT scan at hospital.   Schedule appointment with neurosurgeon. Depression unchanged, stable. Recommend to quit smoking. Start Mederol pack for back pain with food. Don't take aleve or ibuprofen while taking this medication. Use tylenol as needed for pain.

## 2022-04-04 ENCOUNTER — TELEPHONE (OUTPATIENT)
Dept: FAMILY MEDICINE CLINIC | Facility: CLINIC | Age: 57
End: 2022-04-04

## 2022-04-04 NOTE — TELEPHONE ENCOUNTER
Pt was seen 3/31/22, states that he has questions regarding the after visit summary. Would like a call back.

## 2022-04-08 NOTE — TELEPHONE ENCOUNTER
Spoke to pt had question about papers he got from [facility] and items listed on that that pt says are incorrect. Advised pt that he would need to contact [facility] to discuss that with them. Pt also inquired about CT order, informed that it is still pending with insurance.     No further questions

## 2022-04-20 ENCOUNTER — TELEPHONE (OUTPATIENT)
Dept: FAMILY MEDICINE CLINIC | Facility: CLINIC | Age: 57
End: 2022-04-20

## 2022-04-20 NOTE — TELEPHONE ENCOUNTER
Please inform patient that CT scan of her lumbar spine shows multilevel arthritic changes. There is also postsurgical change of lumbar spinal fusion at L3-L5. No evidence of any hardware complication noted. Recommend to schedule appointment with neurosurgeon as discussed during office visit. Return to clinic if any concerns.

## 2022-04-26 ENCOUNTER — HOSPITAL ENCOUNTER (OUTPATIENT)
Dept: GENERAL RADIOLOGY | Age: 57
Discharge: HOME | End: 2022-04-26
Attending: NURSE PRACTITIONER
Payer: MEDICAID

## 2022-04-26 ENCOUNTER — OFFICE VISIT (OUTPATIENT)
Dept: FAMILY MEDICINE CLINIC | Facility: CLINIC | Age: 57
End: 2022-04-26
Payer: MEDICAID

## 2022-04-26 ENCOUNTER — TELEPHONE (OUTPATIENT)
Dept: FAMILY MEDICINE CLINIC | Facility: CLINIC | Age: 57
End: 2022-04-26

## 2022-04-26 VITALS
TEMPERATURE: 97 F | RESPIRATION RATE: 18 BRPM | SYSTOLIC BLOOD PRESSURE: 126 MMHG | HEART RATE: 77 BPM | HEIGHT: 69 IN | DIASTOLIC BLOOD PRESSURE: 88 MMHG | BODY MASS INDEX: 25.48 KG/M2 | OXYGEN SATURATION: 100 % | WEIGHT: 172 LBS

## 2022-04-26 DIAGNOSIS — S99.912A INJURY OF LEFT ANKLE, INITIAL ENCOUNTER: Primary | ICD-10-CM

## 2022-04-26 DIAGNOSIS — S99.912A INJURY OF LEFT ANKLE, INITIAL ENCOUNTER: ICD-10-CM

## 2022-04-26 PROCEDURE — 3008F BODY MASS INDEX DOCD: CPT | Performed by: NURSE PRACTITIONER

## 2022-04-26 PROCEDURE — 73610 X-RAY EXAM OF ANKLE: CPT | Performed by: NURSE PRACTITIONER

## 2022-04-26 PROCEDURE — 3079F DIAST BP 80-89 MM HG: CPT | Performed by: NURSE PRACTITIONER

## 2022-04-26 PROCEDURE — 99213 OFFICE O/P EST LOW 20 MIN: CPT | Performed by: NURSE PRACTITIONER

## 2022-04-26 PROCEDURE — 3074F SYST BP LT 130 MM HG: CPT | Performed by: NURSE PRACTITIONER

## 2022-04-26 NOTE — TELEPHONE ENCOUNTER
----- Message from FANG Pulliam sent at 4/26/2022 12:02 PM CDT -----  Good news. No evidence of fracture nor dislocation. Mild to moderate soft tissue swelling and fluid collection. Continue symptomatic treatment as discussed, aim for resting the foot/ankle and non-weight bearing if able if he has crutches or can get. Ice four times a day as reviewed.

## 2022-04-26 NOTE — PATIENT INSTRUCTIONS
Left ankle xray today, will call you with results. Ice your left ankle four times a day, non-weight bearing as able.   Good support shoes  Await xray results

## 2022-05-02 ENCOUNTER — TELEPHONE (OUTPATIENT)
Dept: SURGERY | Facility: CLINIC | Age: 57
End: 2022-05-02

## 2022-05-02 NOTE — TELEPHONE ENCOUNTER
Recieved a MRI disc of the spine, Brain & Neck & CT disc of the Lumbar Spine. Up loaded into Pac's system.  Disc's are in drawer waiting to return to patient 5/6/2022

## 2022-05-06 ENCOUNTER — OFFICE VISIT (OUTPATIENT)
Dept: SURGERY | Facility: CLINIC | Age: 57
End: 2022-05-06
Payer: MEDICAID

## 2022-05-06 VITALS — DIASTOLIC BLOOD PRESSURE: 66 MMHG | SYSTOLIC BLOOD PRESSURE: 94 MMHG

## 2022-05-06 DIAGNOSIS — Z98.1 S/P LUMBAR FUSION: ICD-10-CM

## 2022-05-06 DIAGNOSIS — M96.1 FAILED BACK SYNDROME: Primary | ICD-10-CM

## 2022-05-06 PROCEDURE — 99203 OFFICE O/P NEW LOW 30 MIN: CPT | Performed by: PHYSICIAN ASSISTANT

## 2022-05-06 PROCEDURE — 3078F DIAST BP <80 MM HG: CPT | Performed by: PHYSICIAN ASSISTANT

## 2022-05-06 PROCEDURE — 3074F SYST BP LT 130 MM HG: CPT | Performed by: PHYSICIAN ASSISTANT

## 2022-05-06 NOTE — PROGRESS NOTES
Patient is here for consult. He has mid back pain with shoot pain numbness and tingling down both legs and feet. This started 5 months ago. He notices his feet are cold all the time. He has hx of low back surgery. He woke from surgery with new left leg nerve pain. This subsided after 2 weeks. He also complains of neck pain. He has weakness of the left leg. He did PT for 2 months for his back pain which helped a little bit. He completed PT 2 weeks ago. His pain is aggravated by leaning forward doing ADLS. Standing makes it worse, sitting relieves it. He is not taking pain medication.

## 2022-05-13 ENCOUNTER — TELEPHONE (OUTPATIENT)
Dept: FAMILY MEDICINE CLINIC | Facility: CLINIC | Age: 57
End: 2022-05-13

## 2022-05-13 NOTE — TELEPHONE ENCOUNTER
[facility] sent a request for a copy of pt's medical records from 3/1/2020 to present. This was sent to [employer].

## 2022-05-31 ENCOUNTER — TELEPHONE (OUTPATIENT)
Dept: SURGERY | Facility: CLINIC | Age: 57
End: 2022-05-31

## 2022-05-31 NOTE — TELEPHONE ENCOUNTER
Received MRI Lumbar Spine report dated 5.27.22 from [facility].   Endorsed to Provider for review

## 2022-06-02 NOTE — TELEPHONE ENCOUNTER
Imaging report reviewed. Overall stable with small new disc at L1-2. He will need to bring in the CD at the appointment so I can review it. He can see me on a Thursday so Dr. Naif Tam will be available.

## 2022-06-28 ENCOUNTER — TELEPHONE (OUTPATIENT)
Dept: SURGERY | Facility: CLINIC | Age: 57
End: 2022-06-28

## 2022-06-28 NOTE — TELEPHONE ENCOUNTER
Scheduling note says he would like to see Dr. Kasey Cisse at his appointment with me on Thursday. Dr. Kasey Cisse will not be in the office for 2 weeks. I can see the patient this week but if he would like to see Dr. Kasey Cisse he should reschedule 2 weeks from now.

## 2022-06-28 NOTE — TELEPHONE ENCOUNTER
LVM to confirm if patient would like to keep apt as scheduled or reschedule for another date with Marley Lunsford when Romeo Islas is in the office. Please assist when call is returned.

## 2022-06-30 ENCOUNTER — TELEMEDICINE (OUTPATIENT)
Dept: SURGERY | Facility: CLINIC | Age: 57
End: 2022-06-30
Payer: MEDICAID

## 2022-06-30 DIAGNOSIS — Z98.1 S/P LUMBAR FUSION: ICD-10-CM

## 2022-06-30 DIAGNOSIS — M96.1 FAILED BACK SYNDROME: Primary | ICD-10-CM

## 2022-06-30 PROCEDURE — 99212 OFFICE O/P EST SF 10 MIN: CPT | Performed by: PHYSICIAN ASSISTANT

## 2022-06-30 NOTE — PROGRESS NOTES
Neurosurgery Televisit  2022    Ivan Sim PCP:  Bijan Swartz MD    1965 MRN [id number]       CC:  Failed Back Syndrome    HPI:    Saida Helton continues to experience neuropathic pain, tingling, numbness in the legs. His back pain is a little better with the heat. No significant change in symptoms. Prior Hx: Saida Helton is a very pleasant 55year old male with PMH of lumbar surgery x 3 who presents with chronic worsening symptoms. He had a lumbar decompression around 20 years ago, a repeat lumbar decompression a few years later, followed by a lumbar fusion around  at [facility]. He does not remember the surgeon. After his last surgery he had severe neuropathic pain in the left leg with atrophy and weakness. Overtime the pain did improve. Over the last year he has had worsening b/l foot numbness/burning slowly ascending up to the knee. He has minimal tingling in the fingers. Gabapentin helps a great deal with his neuropathic pain but still has numbness. His main pain is low thoracic to lumbar. He has intermittent neck pain which self-resolves. He denies any radiating UE symptoms. Denies hx of MI, CVA, CA, or issues without wound healing or anesthesia. Imaging:    CT Lumbar -     Healed fusion L3-5, he also appears to be fused at L5-S1, possibly some autofusion of the facet joints at L2-3. T12-L1 > L1-2 spondylosis    MRI Lumbar w/ and w/o -    No significant central canal stenosis. L2-3 disc well hydrated  T12-L1 spondylosis with disc height collapse  L1-2 spondylosis with modic changes and left sided disc bulge    PE deferred due to televisit    A/P:    1. Failed back syndrome    2. S/P lumbar fusion      Reviewed imaging, no significant central canal stenosis to explain b/l LE symptoms. He does not have a pseudoarthrosis. Discussed extending his fusion for his spondylosis would be a large surgery with great risk and limited benefit.   Offered SCS as a better option to cover his chronic neuropathic symptoms. Pain eval for SCS trial.    Patience Meadows verbally consents to a Telephone Check-In service on 06/30/22. Patient understands and accepts financial responsibility for any deductible, co-insurance and/or co-pays associated with this service.     AMANULE Robles [facility]  6/30/2022  10:18 AM

## 2022-09-26 ENCOUNTER — TELEPHONE (OUTPATIENT)
Dept: FAMILY MEDICINE CLINIC | Facility: CLINIC | Age: 57
End: 2022-09-26

## 2022-09-26 NOTE — TELEPHONE ENCOUNTER
Received request to send 4/27/22 to present records to [place] [employer] of [employer], Div of JONATAN K[employer], Disability Determination, [address], [place], [address] [facility].  Sent to [employer]

## 2022-10-12 DIAGNOSIS — R20.0 NUMBNESS AND TINGLING IN BOTH HANDS: ICD-10-CM

## 2022-10-12 DIAGNOSIS — R20.0 NUMBNESS AND TINGLING OF BOTH LEGS: ICD-10-CM

## 2022-10-12 DIAGNOSIS — R20.2 NUMBNESS AND TINGLING IN BOTH HANDS: ICD-10-CM

## 2022-10-12 DIAGNOSIS — R20.2 NUMBNESS AND TINGLING OF BOTH LEGS: ICD-10-CM

## 2022-10-12 NOTE — TELEPHONE ENCOUNTER
LM for pt needs appt      Last appt 3/31/22 advised Return in about 3 months (around 6/30/2022) for physical.    Future appt:    Last Appointment with provider:   Visit date not found  Last appointment at [facility]:  4/26/2022  No results found for: CHOLEST, HDL, LDL, TRIGLY, TRIG  No results found for: EAG, A1C  Lab Results   Component Value Date    TSH 0.819 05/07/2021       No follow-ups on file.

## 2022-10-13 RX ORDER — GABAPENTIN 400 MG/1
400 CAPSULE ORAL 3 TIMES DAILY
Qty: 90 CAPSULE | Refills: 2 | Status: SHIPPED | OUTPATIENT
Start: 2022-10-13

## 2022-10-13 NOTE — TELEPHONE ENCOUNTER
ok to release refill, physical set for 10-19     Future Appointments   Date Time Provider [place]   10/19/2022  1:30 PM Bee Hendrickson MD [facility]

## 2022-10-26 ENCOUNTER — OFFICE VISIT (OUTPATIENT)
Dept: FAMILY MEDICINE CLINIC | Facility: CLINIC | Age: 57
End: 2022-10-26
Payer: MEDICAID

## 2022-10-26 VITALS
BODY MASS INDEX: 26.78 KG/M2 | SYSTOLIC BLOOD PRESSURE: 108 MMHG | HEIGHT: 69 IN | OXYGEN SATURATION: 96 % | WEIGHT: 180.81 LBS | TEMPERATURE: 98 F | DIASTOLIC BLOOD PRESSURE: 74 MMHG | RESPIRATION RATE: 18 BRPM | HEART RATE: 95 BPM

## 2022-10-26 DIAGNOSIS — Z00.00 PHYSICAL EXAM: Primary | ICD-10-CM

## 2022-10-26 DIAGNOSIS — M54.16 LUMBAR RADICULOPATHY: ICD-10-CM

## 2022-10-26 PROBLEM — F32.A DEPRESSION: Status: RESOLVED | Noted: 2021-12-17 | Resolved: 2022-10-26

## 2022-10-26 PROBLEM — E83.42 HYPOMAGNESEMIA: Status: RESOLVED | Noted: 2021-12-17 | Resolved: 2022-10-26

## 2022-10-26 PROBLEM — F41.9 ANXIETY: Status: RESOLVED | Noted: 2021-05-07 | Resolved: 2022-10-26

## 2022-10-26 PROBLEM — R25.1 TREMOR: Status: RESOLVED | Noted: 2021-12-17 | Resolved: 2022-10-26

## 2022-10-26 PROBLEM — E87.6 HYPOKALEMIA: Status: RESOLVED | Noted: 2021-12-17 | Resolved: 2022-10-26

## 2022-10-26 PROBLEM — K52.9 CHRONIC DIARRHEA: Status: RESOLVED | Noted: 2021-07-21 | Resolved: 2022-10-26

## 2022-10-26 PROBLEM — M25.561 ACUTE PAIN OF RIGHT KNEE: Status: RESOLVED | Noted: 2017-06-21 | Resolved: 2022-10-26

## 2022-10-26 PROCEDURE — 3008F BODY MASS INDEX DOCD: CPT | Performed by: FAMILY MEDICINE

## 2022-10-26 PROCEDURE — 99396 PREV VISIT EST AGE 40-64: CPT | Performed by: FAMILY MEDICINE

## 2022-10-26 PROCEDURE — 3078F DIAST BP <80 MM HG: CPT | Performed by: FAMILY MEDICINE

## 2022-10-26 PROCEDURE — 3074F SYST BP LT 130 MM HG: CPT | Performed by: FAMILY MEDICINE

## 2022-10-26 NOTE — PATIENT INSTRUCTIONS
Continue current medications   Blood pressure stable today. Recommend shingles vaccine. Schedule fasting labs. Return to clinic if any concern.

## 2023-01-15 DIAGNOSIS — R20.0 NUMBNESS AND TINGLING OF BOTH LEGS: ICD-10-CM

## 2023-01-15 DIAGNOSIS — R20.0 NUMBNESS AND TINGLING IN BOTH HANDS: ICD-10-CM

## 2023-01-15 DIAGNOSIS — R20.2 NUMBNESS AND TINGLING OF BOTH LEGS: ICD-10-CM

## 2023-01-15 DIAGNOSIS — R20.2 NUMBNESS AND TINGLING IN BOTH HANDS: ICD-10-CM

## 2023-01-16 RX ORDER — GABAPENTIN 400 MG/1
CAPSULE ORAL
Qty: 90 CAPSULE | Refills: 2 | Status: SHIPPED | OUTPATIENT
Start: 2023-01-16

## 2023-01-16 NOTE — TELEPHONE ENCOUNTER
Return in about 6 months (around 4/26/2023) for follow up    Future appt:    Last Appointment with provider:   10/26/2022  Last appointment at [facility]:  10/26/2022  No results found for: CHOLEST, HDL, LDL, TRIGLY, TRIG  No results found for: EAG, A1C  Lab Results   Component Value Date    TSH 0.819 05/07/2021       No follow-ups on file.

## 2023-01-31 ENCOUNTER — TELEPHONE (OUTPATIENT)
Dept: SURGERY | Facility: CLINIC | Age: 58
End: 2023-01-31

## 2023-01-31 NOTE — TELEPHONE ENCOUNTER
Pt called stating he is trying to get disability.  Provided our fax number if he moves forward with his request.

## 2023-02-02 ENCOUNTER — OFFICE VISIT (OUTPATIENT)
Dept: FAMILY MEDICINE CLINIC | Facility: CLINIC | Age: 58
End: 2023-02-02
Payer: MEDICAID

## 2023-02-02 VITALS
TEMPERATURE: 98 F | WEIGHT: 182 LBS | OXYGEN SATURATION: 98 % | SYSTOLIC BLOOD PRESSURE: 132 MMHG | RESPIRATION RATE: 16 BRPM | HEART RATE: 95 BPM | DIASTOLIC BLOOD PRESSURE: 82 MMHG | HEIGHT: 69 IN | BODY MASS INDEX: 26.96 KG/M2

## 2023-02-02 DIAGNOSIS — M54.16 LUMBAR RADICULOPATHY: ICD-10-CM

## 2023-02-02 DIAGNOSIS — G89.29 CHRONIC LEFT-SIDED LOW BACK PAIN WITH LEFT-SIDED SCIATICA: Primary | ICD-10-CM

## 2023-02-02 DIAGNOSIS — Z98.1 S/P LUMBAR FUSION: ICD-10-CM

## 2023-02-02 DIAGNOSIS — M54.42 CHRONIC LEFT-SIDED LOW BACK PAIN WITH LEFT-SIDED SCIATICA: Primary | ICD-10-CM

## 2023-02-02 PROCEDURE — 3079F DIAST BP 80-89 MM HG: CPT | Performed by: FAMILY MEDICINE

## 2023-02-02 PROCEDURE — 3008F BODY MASS INDEX DOCD: CPT | Performed by: FAMILY MEDICINE

## 2023-02-02 PROCEDURE — 99214 OFFICE O/P EST MOD 30 MIN: CPT | Performed by: FAMILY MEDICINE

## 2023-02-02 PROCEDURE — 3075F SYST BP GE 130 - 139MM HG: CPT | Performed by: FAMILY MEDICINE

## 2023-02-02 RX ORDER — METHYLPREDNISOLONE 4 MG/1
TABLET ORAL
Qty: 21 EACH | Refills: 0 | Status: SHIPPED | OUTPATIENT
Start: 2023-02-02

## 2023-02-02 RX ORDER — METHOCARBAMOL 500 MG/1
500 TABLET, FILM COATED ORAL NIGHTLY PRN
Qty: 20 TABLET | Refills: 0 | Status: SHIPPED | OUTPATIENT
Start: 2023-02-02

## 2023-02-02 NOTE — PATIENT INSTRUCTIONS
Advice rest, heating pad, tylenol as needed   Start oral steroid with food. Take Methocarbamol as needed. Medication will make you drowsy, so no driving after taking medication. Start physical therapy. Return to clinic if no improvement.

## 2023-03-20 ENCOUNTER — TELEPHONE (OUTPATIENT)
Dept: FAMILY MEDICINE CLINIC | Facility: CLINIC | Age: 58
End: 2023-03-20

## 2023-03-20 NOTE — TELEPHONE ENCOUNTER
Patient needing follow up visit for back pain due  To new areas of pain. Requesting medication refill. Follow up appointment given with [facility] FANG  For evaluation of symptoms. Future appt: Your appointments     Date & Time Appointment Department [facility])    Mar 21, 2023  1:00 PM CDT Exam - Established with FANG Langford [address] [place] [address])            [address] [place] [address]  [phone]        Last Appointment with provider:   2/2/2023  Last appointment at [facility]:  2/2/2023  No results found for: CHOLEST, HDL, LDL, TRIGLY, TRIG  No results found for: EAG, A1C  Lab Results   Component Value Date    TSH 0.819 05/07/2021       No follow-ups on file.

## 2023-03-20 NOTE — TELEPHONE ENCOUNTER
Still in a lot of pain, doing therapy. tried getting him in for an appointment, nothing available for Dr within two weeks  No future appointments.

## 2023-03-21 ENCOUNTER — OFFICE VISIT (OUTPATIENT)
Dept: FAMILY MEDICINE CLINIC | Facility: CLINIC | Age: 58
End: 2023-03-21
Payer: MEDICAID

## 2023-03-21 VITALS
BODY MASS INDEX: 26.96 KG/M2 | TEMPERATURE: 97 F | WEIGHT: 182 LBS | SYSTOLIC BLOOD PRESSURE: 112 MMHG | HEART RATE: 100 BPM | RESPIRATION RATE: 20 BRPM | DIASTOLIC BLOOD PRESSURE: 60 MMHG | HEIGHT: 69 IN

## 2023-03-21 DIAGNOSIS — G89.29 CHRONIC LEFT-SIDED LOW BACK PAIN WITH LEFT-SIDED SCIATICA: ICD-10-CM

## 2023-03-21 DIAGNOSIS — Z98.1 S/P LUMBAR FUSION: ICD-10-CM

## 2023-03-21 DIAGNOSIS — M54.16 LUMBAR RADICULOPATHY: Primary | ICD-10-CM

## 2023-03-21 DIAGNOSIS — M54.42 CHRONIC LEFT-SIDED LOW BACK PAIN WITH LEFT-SIDED SCIATICA: ICD-10-CM

## 2023-03-21 PROCEDURE — 3074F SYST BP LT 130 MM HG: CPT

## 2023-03-21 PROCEDURE — 3008F BODY MASS INDEX DOCD: CPT

## 2023-03-21 PROCEDURE — 3078F DIAST BP <80 MM HG: CPT

## 2023-03-21 PROCEDURE — 99214 OFFICE O/P EST MOD 30 MIN: CPT

## 2023-03-21 RX ORDER — GABAPENTIN 100 MG/1
100 CAPSULE ORAL 3 TIMES DAILY
Qty: 90 CAPSULE | Refills: 0 | Status: SHIPPED | OUTPATIENT
Start: 2023-03-21 | End: 2023-04-20

## 2023-03-21 RX ORDER — METHOCARBAMOL 500 MG/1
500 TABLET, FILM COATED ORAL NIGHTLY PRN
Qty: 20 TABLET | Refills: 0 | Status: SHIPPED | OUTPATIENT
Start: 2023-03-21

## 2023-03-21 NOTE — PATIENT INSTRUCTIONS
2nd opinion for your neuropathy and nerve pain    Increase gabapentin to 500mg three times a day    Follow up in one week to see how you are doing.

## 2023-03-24 ENCOUNTER — TELEPHONE (OUTPATIENT)
Dept: FAMILY MEDICINE CLINIC | Facility: CLINIC | Age: 58
End: 2023-03-24

## 2023-03-24 NOTE — TELEPHONE ENCOUNTER
Received forms from [employer] for pt. Dr. Nancy Sal reviewed papers and need to inform that pt that we will not be able to complete the forms. Pt may have the medical records if needed and sign consent to receive.     LM for pt to call back

## 2023-03-27 ENCOUNTER — TELEPHONE (OUTPATIENT)
Dept: FAMILY MEDICINE CLINIC | Facility: CLINIC | Age: 58
End: 2023-03-27

## 2023-03-27 NOTE — TELEPHONE ENCOUNTER
Received request to send all medical records from 10/27/22 to 03/20/23 to [employer] [address] [address], [place], [address].  Sent to [employer]

## 2023-04-25 DIAGNOSIS — R20.0 NUMBNESS AND TINGLING OF BOTH LEGS: ICD-10-CM

## 2023-04-25 DIAGNOSIS — R20.2 NUMBNESS AND TINGLING OF BOTH LEGS: ICD-10-CM

## 2023-04-25 DIAGNOSIS — R20.0 NUMBNESS AND TINGLING IN BOTH HANDS: ICD-10-CM

## 2023-04-25 DIAGNOSIS — R20.2 NUMBNESS AND TINGLING IN BOTH HANDS: ICD-10-CM

## 2023-04-25 RX ORDER — GABAPENTIN 400 MG/1
CAPSULE ORAL
Qty: 90 CAPSULE | Refills: 0 | Status: SHIPPED | OUTPATIENT
Start: 2023-04-25

## 2023-04-25 NOTE — TELEPHONE ENCOUNTER
Future appt:    Last Appointment with provider:   2/2/2023; No f/u recommended    Last appointment at [facility]:  3/21/2023  No results found for: CHOLEST, HDL, LDL, TRIGLY, TRIG  No results found for: EAG, A1C  Lab Results   Component Value Date    TSH 0.819 05/07/2021     Last RF:  1/16/2023    No follow-ups on file.

## 2023-05-12 NOTE — TELEPHONE ENCOUNTER
Called PT to advise what he would like us to do with imaging disc, requested disc be mailed to his home address. Mailed out 5/12/23.

## 2023-06-08 ENCOUNTER — TELEPHONE (OUTPATIENT)
Dept: FAMILY MEDICINE CLINIC | Facility: CLINIC | Age: 58
End: 2023-06-08

## 2023-06-08 NOTE — TELEPHONE ENCOUNTER
Received fax from [address] [place] [address] requesting to be informed if disability forms would be completed. Per phone note 3/24- Dr. Filiberto Escalera can not complete and pt was notified. 3/27 request for medical records from above company was sent to [employer]    Todays fax returned to company noting above.

## 2023-11-09 ENCOUNTER — TELEPHONE (OUTPATIENT)
Dept: NEUROLOGY | Facility: CLINIC | Age: 58
End: 2023-11-09
